# Patient Record
Sex: MALE | Race: WHITE
[De-identification: names, ages, dates, MRNs, and addresses within clinical notes are randomized per-mention and may not be internally consistent; named-entity substitution may affect disease eponyms.]

---

## 2019-09-16 ENCOUNTER — HOSPITAL ENCOUNTER (INPATIENT)
Dept: HOSPITAL 47 - EC | Age: 29
LOS: 9 days | Discharge: HOME | DRG: 880 | End: 2019-09-25
Attending: PSYCHIATRY & NEUROLOGY | Admitting: PSYCHIATRY & NEUROLOGY
Payer: MEDICAID

## 2019-09-16 DIAGNOSIS — F41.9: ICD-10-CM

## 2019-09-16 DIAGNOSIS — Z71.6: ICD-10-CM

## 2019-09-16 DIAGNOSIS — F17.210: ICD-10-CM

## 2019-09-16 DIAGNOSIS — F12.10: ICD-10-CM

## 2019-09-16 DIAGNOSIS — G47.00: ICD-10-CM

## 2019-09-16 DIAGNOSIS — F31.13: ICD-10-CM

## 2019-09-16 DIAGNOSIS — Z90.49: ICD-10-CM

## 2019-09-16 DIAGNOSIS — E07.9: ICD-10-CM

## 2019-09-16 DIAGNOSIS — R45.851: Primary | ICD-10-CM

## 2019-09-16 DIAGNOSIS — F10.10: ICD-10-CM

## 2019-09-16 PROCEDURE — 99285 EMERGENCY DEPT VISIT HI MDM: CPT

## 2019-09-16 PROCEDURE — 84443 ASSAY THYROID STIM HORMONE: CPT

## 2019-09-16 PROCEDURE — 80053 COMPREHEN METABOLIC PANEL: CPT

## 2019-09-16 PROCEDURE — 80164 ASSAY DIPROPYLACETIC ACD TOT: CPT

## 2019-09-16 PROCEDURE — 80061 LIPID PANEL: CPT

## 2019-09-16 PROCEDURE — 85025 COMPLETE CBC W/AUTO DIFF WBC: CPT

## 2019-09-16 PROCEDURE — 83036 HEMOGLOBIN GLYCOSYLATED A1C: CPT

## 2019-09-16 PROCEDURE — 81003 URINALYSIS AUTO W/O SCOPE: CPT

## 2019-09-16 PROCEDURE — 82075 ASSAY OF BREATH ETHANOL: CPT

## 2019-09-16 PROCEDURE — 96372 THER/PROPH/DIAG INJ SC/IM: CPT

## 2019-09-16 PROCEDURE — 80306 DRUG TEST PRSMV INSTRMNT: CPT

## 2019-09-16 NOTE — ED
Psych HPI





- General


Source: patient, police, RN notes reviewed


Mode of arrival: ambulatory


Limitations: no limitations





<Juarez Funk - Last Filed: 09/16/19 13:44>





- History of Present Illness


MD Complaint: suicidal ideation, other (manic activity)


Associated Psychiatric Symptoms: racing thoughts, auditory hallucinations, 

visual hallucinations, delusions


Improves With: none


Associated Symptoms: insomnia


Treatments Prior to Arrival: placed on mental health hold





<Martir Garcia - Last Filed: 09/16/19 18:07>





- General


Chief Complaint: Psychiatric Symptoms


Stated Complaint: Mental Health


Time Seen by Provider: 09/16/19 13:17





- History of Present Illness


Initial Comments: 





This a 28-year-old male presents emergency Department with [facility] 

department for psychiatric evaluation.  Patient was petitioned ordered by family

for psychiatric evaluation he has no psychiatric history denies being suicidal 

or homicidal.  Patient has been having very manic behavior but states that he is

just changing things in his life.  He denies any drug use other than marijuana. 

No alcohol abuse.  Patient states he was seen at [facility] a few

days ago and had evaluation there also. (Juarez Funk)


28-year-old patient with for psychiatric evaluation by , patient refusing 

to sign in voluntarily (Martir Garcia)





- Related Data


                                Home Medications











 Medication  Instructions  Recorded  Confirmed


 


No Known Home Medications  12/04/17 09/16/19











                                    Allergies











Allergy/AdvReac Type Severity Reaction Status Date / Time


 


No Known Allergies Allergy   Verified 09/16/19 13:45














Review of Systems


ROS Other: All systems not noted in ROS Statement are negative.





<Juarez Funk - Last Filed: 09/16/19 13:44>


ROS Other: All systems not noted in ROS Statement are negative.





<Martir Garcia - Last Filed: 09/16/19 18:07>


ROS Statement: 


Those systems with pertinent positive or pertinent negative responses have been 

documented in the HPI.








Past Medical History


Past Medical History: Thyroid Disorder


History of Any Multi-Drug Resistant Organisms: None Reported


Past Surgical History: Appendectomy, Hernia Repair


Past Psychological History: Anxiety


Smoking Status: Current every day smoker


Past Alcohol Use History: Occasional


Past Drug Use History: None Reported





<Juarez Funk - Last Filed: 09/16/19 13:44>





General Exam


Limitations: no limitations


General appearance: alert, in no apparent distress, anxious


Head exam: Present: atraumatic, normocephalic, normal inspection


Eye exam: Present: normal appearance, PERRL, EOMI.  Absent: scleral icterus, 

conjunctival injection, periorbital swelling


ENT exam: Present: normal exam, normal oropharynx, mucous membranes moist


Neck exam: Present: normal inspection.  Absent: tenderness, meningismus, 

lymphadenopathy


Respiratory exam: Present: normal lung sounds bilaterally.  Absent: respiratory 

distress, wheezes, rales, rhonchi, stridor


Cardiovascular Exam: Present: regular rate, normal rhythm, normal heart sounds. 

Absent: systolic murmur, diastolic murmur, rubs, gallop, clicks


GI/Abdominal exam: Present: soft, normal bowel sounds.  Absent: distended, 

tenderness, guarding, rebound, rigid


Neurological exam: Present: alert, oriented X3, CN II-XII intact


Psychiatric exam: Present: manic





<Juarez Funk M - Last Filed: 09/16/19 13:44>


General appearance: alert, in no apparent distress, anxious


Head exam: Present: atraumatic, normocephalic, normal inspection


Eye exam: Present: normal appearance, EOMI.  Absent: scleral icterus, 

conjunctival injection, periorbital swelling


ENT exam: Present: normal exam, mucous membranes moist


Neck exam: Present: normal inspection.  Absent: tenderness, meningismus, 

lymphadenopathy


Respiratory exam: Present: normal lung sounds bilaterally.  Absent: respiratory 

distress, wheezes, rales, rhonchi, stridor


Cardiovascular Exam: Present: regular rate, normal rhythm, normal heart sounds. 

Absent: systolic murmur, diastolic murmur, rubs, gallop, clicks


GI/Abdominal exam: Present: soft, normal bowel sounds.  Absent: distended, 

tenderness, guarding, rebound, rigid


Extremities exam: Present: normal inspection, full ROM, normal capillary refill.

 Absent: tenderness, pedal edema, joint swelling, calf tenderness


Back exam: Present: normal inspection


Neurological exam: Present: alert, oriented X3, CN II-XII intact


Psychiatric exam: Present: agitated (At times), manic


Skin exam: Present: warm, dry, intact, normal color.  Absent: rash





<Martir Garcia B - Last Filed: 09/16/19 18:07>





Course





<Martir Garcia - Last Filed: 09/16/19 18:07>





                                   Vital Signs











  09/16/19





  13:10


 


Temperature 98.2 F


 


Pulse Rate 78


 


Respiratory 18





Rate 


 


Blood Pressure 161/80


 


O2 Sat by Pulse 100





Oximetry 














- Reevaluation(s)


Reevaluation #1: 





09/16/19 18:06


Patient medically clear for psychiatric evaluation





Patient was seen and evaluated by myself here in the ER (Martir Garcia)





Medical Decision Making





<Martir Garcia - Last Filed: 09/16/19 18:07>





- Medical Decision Making





28 male the ER for evaluation patient be admitted for psychiatric evaluation and

treatment. (Martir Garcia)





- Lab Data





                                   Lab Results











  09/16/19 Range/Units





  14:08 


 


Urine Opiates Screen  Not Detected  (NotDetected)  


 


Ur Oxycodone Screen  Not Detected  (NotDetected)  


 


Urine Methadone Screen  Not Detected  (NotDetected)  


 


Ur Propoxyphene Screen  Not Detected  (NotDetected)  


 


Ur Barbiturates Screen  Not Detected  (NotDetected)  


 


U Tricyclic Antidepress  Not Detected  (NotDetected)  


 


Ur Phencyclidine Scrn  Not Detected  (NotDetected)  


 


Ur Amphetamines Screen  Not Detected  (NotDetected)  


 


U Methamphetamines Scrn  Not Detected  (NotDetected)  


 


U Benzodiazepines Scrn  Not Detected  (NotDetected)  


 


Urine Cocaine Screen  Not Detected  (NotDetected)  


 


U Marijuana (THC) Screen  Detected H  (NotDetected)  














Disposition





<Juarez Funk - Last Filed: 09/16/19 13:44>


Is patient prescribed a controlled substance at d/c from ED?: No





<Martir Garcia - Last Filed: 09/16/19 18:07>


Clinical Impression: 


 Psychosis, Monopolar yolanda





Disposition: TRANSFER TO PSYCH HOSP/UNIT


Condition: Fair


Referrals: 


Atilio Franco MD [Primary Care Provider] - 1-2 days

## 2019-09-17 LAB
ALBUMIN SERPL-MCNC: 4.6 G/DL (ref 3.5–5)
ALP SERPL-CCNC: 69 U/L (ref 38–126)
ALT SERPL-CCNC: 37 U/L (ref 21–72)
ANION GAP SERPL CALC-SCNC: 13 MMOL/L
AST SERPL-CCNC: 48 U/L (ref 17–59)
BASOPHILS # BLD AUTO: 0.1 K/UL (ref 0–0.2)
BASOPHILS NFR BLD AUTO: 1 %
BUN SERPL-SCNC: 12 MG/DL (ref 9–20)
CALCIUM SPEC-MCNC: 9.6 MG/DL (ref 8.4–10.2)
CHLORIDE SERPL-SCNC: 109 MMOL/L (ref 98–107)
CHOLEST SERPL-MCNC: 114 MG/DL (ref ?–200)
CO2 SERPL-SCNC: 22 MMOL/L (ref 22–30)
EOSINOPHIL # BLD AUTO: 0.2 K/UL (ref 0–0.7)
EOSINOPHIL NFR BLD AUTO: 3 %
ERYTHROCYTE [DISTWIDTH] IN BLOOD BY AUTOMATED COUNT: 4.91 M/UL (ref 4.3–5.9)
ERYTHROCYTE [DISTWIDTH] IN BLOOD: 12.8 % (ref 11.5–15.5)
GLUCOSE SERPL-MCNC: 103 MG/DL (ref 74–99)
HBA1C MFR BLD: 5.4 % (ref 4–6)
HCT VFR BLD AUTO: 46.6 % (ref 39–53)
HDLC SERPL-MCNC: 48 MG/DL (ref 40–60)
HGB BLD-MCNC: 15.6 GM/DL (ref 13–17.5)
LDLC SERPL CALC-MCNC: 53 MG/DL (ref 0–99)
LYMPHOCYTES # SPEC AUTO: 2 K/UL (ref 1–4.8)
LYMPHOCYTES NFR SPEC AUTO: 26 %
MCH RBC QN AUTO: 31.8 PG (ref 25–35)
MCHC RBC AUTO-ENTMCNC: 33.5 G/DL (ref 31–37)
MCV RBC AUTO: 94.9 FL (ref 80–100)
MONOCYTES # BLD AUTO: 0.6 K/UL (ref 0–1)
MONOCYTES NFR BLD AUTO: 8 %
NEUTROPHILS # BLD AUTO: 4.6 K/UL (ref 1.3–7.7)
NEUTROPHILS NFR BLD AUTO: 59 %
PLATELET # BLD AUTO: 273 K/UL (ref 150–450)
POTASSIUM SERPL-SCNC: 4.6 MMOL/L (ref 3.5–5.1)
PROT SERPL-MCNC: 7.8 G/DL (ref 6.3–8.2)
SODIUM SERPL-SCNC: 144 MMOL/L (ref 137–145)
TRIGL SERPL-MCNC: 66 MG/DL (ref ?–150)
WBC # BLD AUTO: 7.7 K/UL (ref 3.8–10.6)

## 2019-09-17 RX ADMIN — NICOTINE SCH PATCH: 21 PATCH, EXTENDED RELEASE TRANSDERMAL at 14:23

## 2019-09-17 RX ADMIN — Medication SCH MG: at 21:22

## 2019-09-17 NOTE — P.CONS
History of Present Illness





- History of Present Illness





This is a pleasant 28 years old male with past medical history of anxiety that 

is been managed by his primary care doctor, Previous history of alcohol abuse 

currently is abstinent with his last check about 2 months ago as per patient.  

He was admitted to the hospital yesterday to the mental health unit for racing 

thoughts, auditory hallucinations, visual hallucinations, delusions.  We are 

asked to see the patient for medical management.  Patient denies chest pain.  No

dyspnea.  No abdominal pain or nausea vomiting.  No change in urine or polyps.  

No fever.  He denies a smoking and he quit alcohol drinking about 2 months ago. 

He uses marijuana as medical prescription echo as he states it helped him with 

his sleep for his insomnia and with his appetite.


Patient is afebrile and all labs are stable.  Labs including CBC, BMP and liver 

enzymes were unremarkable, urine drug screen is positive for marijuana.  Patient

is on Ativan when necessary, Tylenol and Geodon.














Review of Systems





CONSTITUTIONAL: No fever, no malaise, no fatigue. 


HEENT: No recent visual problems or hearing problems. Denied any sore throat. 


CARDIOVASCULAR: No  orthopnea, PND, no palpitations, no syncope. 


PULMONARY: No shortness of breath, no cough, no hemoptysis. 


GASTROINTESTINAL: No diarrhea, no nausea, no vomiting, no abdominal pain. 

Normoactive bowel sounds. 


NEUROLOGICAL: No headaches, no weakness, no numbness. 


HEMATOLOGICAL: Denies any bleeding or petechiae. 


GENITOURINARY: Denies any burning micturition, frequency, or urgency. 


MUSCULOSKELETAL/RHEUMATOLOGICAL: Denies any joint pain, swelling, or any muscle 

pain. 


ENDOCRINE: Denies any polyuria or polydipsia.











Past Medical History


Past Medical History: Thyroid Disorder


History of Any Multi-Drug Resistant Organisms: None Reported


Past Surgical History: Appendectomy, Hernia Repair


Smoking Status: Current every day smoker





Medications and Allergies


                                Home Medications











 Medication  Instructions  Recorded  Confirmed  Type


 


No Known Home Medications  12/04/17 09/16/19 History








                                    Allergies











Allergy/AdvReac Type Severity Reaction Status Date / Time


 


No Known Allergies Allergy   Verified 09/16/19 13:45














Physical Exam


Vitals: 


                                   Vital Signs











  Temp Pulse Pulse Resp BP BP Pulse Ox


 


 09/17/19 06:52  98 F   65  16   109/62 


 


 09/16/19 22:45  97.5 F L   63  16   119/71 


 


 09/16/19 21:00  97.3 F L  75   18  116/57   98








                                Intake and Output











 09/16/19 09/17/19 09/17/19





 22:59 06:59 14:59


 


Other:   


 


  Weight  68.039 kg 














GENERAL: The patient is alert and oriented x3, not in any acute distress. Well 

developed, well nourished. 


HEENT: Pupils are round and equally reacting to light. EOMI. No scleral icterus.

No conjunctival pallor. Normocephalic, atraumatic. No pharyngeal erythema. No 

thyromegaly. 


CARDIOVASCULAR: S1 and S2 present. No murmurs, rubs, or gallops. 


PULMONARY: Chest is clear to auscultation, no wheezing or crackles. 


ABDOMEN: Soft, nontender, nondistended, normoactive bowel sounds. No palpable 

organomegaly. 


MUSCULOSKELETAL: No joint swelling or deformity. 


EXTREMITIES: No cyanosis, clubbing, or pedal edema. 


NEUROLOGICAL: Gross neurological examination did not reveal any focal deficits. 


SKIN: No rashes.











Results


CBC & Chem 7: 


                                 09/17/19 07:42





                                 09/17/19 07:42


Labs: 


                  Abnormal Lab Results - Last 24 Hours (Table)











  09/16/19 09/17/19 Range/Units





  14:08 07:42 


 


Chloride   109 H  ()  mmol/L


 


Glucose   103 H  (74-99)  mg/dL


 


U Marijuana (THC) Screen  Detected H   (NotDetected)  














Assessment and Plan


Assessment: 





Possible psychosis and other psychiatric illnesses.  Management as per the 

primary psychiatrist team


Insomnia, mostly related to his psychiatric illness


Previous history of alcohol abuse














Plan: 





This is a pleasant 28 years old male who presents with possible psychosis.labs 

and medication were reviewed..  Continue same treatment.  Continue with 

symptomatic treatment.  Resume home medication.  Monitor lytes and vitals.  DVT 

and GI prophylaxis.  Further recommendations of the clinical course of the 

patient


We recommend patient follow up with his PCP within one week after discharge





Thank you for consulting us.  We will see the patient and as needed basis.  

Please feel free to contact us for any further question or concerns

## 2019-09-17 NOTE — P.HP
Psychiatric H&P





- .


H&P Date: 09/17/19


History & Physical: 


                                    Allergies











Allergy/AdvReac Type Severity Reaction Status Date / Time


 


No Known Allergies Allergy   Verified 09/16/19 13:45








                                   Vital Signs











Temp  98 F   09/17/19 06:52


 


Pulse  65   09/17/19 06:52


 


Resp  16   09/17/19 06:52


 


BP  109/62   09/17/19 06:52


 


Pulse Ox  98   09/16/19 21:00








                                 Intake & Output











 09/16/19 09/17/19 09/17/19





 18:59 06:59 18:59


 


Weight 68.946 kg 68.039 kg 








                             Laboratory Last Values











WBC  7.7 k/uL (3.8-10.6)   09/17/19  07:42    


 


RBC  4.91 m/uL (4.30-5.90)   09/17/19  07:42    


 


Hgb  15.6 gm/dL (13.0-17.5)   09/17/19  07:42    


 


Hct  46.6 % (39.0-53.0)   09/17/19  07:42    


 


MCV  94.9 fL (80.0-100.0)   09/17/19  07:42    


 


MCH  31.8 pg (25.0-35.0)   09/17/19  07:42    


 


MCHC  33.5 g/dL (31.0-37.0)   09/17/19  07:42    


 


RDW  12.8 % (11.5-15.5)   09/17/19  07:42    


 


Plt Count  273 k/uL (150-450)   09/17/19  07:42    


 


Neutrophils %  59 %  09/17/19  07:42    


 


Lymphocytes %  26 %  09/17/19  07:42    


 


Monocytes %  8 %  09/17/19  07:42    


 


Eosinophils %  3 %  09/17/19  07:42    


 


Basophils %  1 %  09/17/19  07:42    


 


Neutrophils #  4.6 k/uL (1.3-7.7)   09/17/19  07:42    


 


Lymphocytes #  2.0 k/uL (1.0-4.8)   09/17/19  07:42    


 


Monocytes #  0.6 k/uL (0-1.0)   09/17/19  07:42    


 


Eosinophils #  0.2 k/uL (0-0.7)   09/17/19  07:42    


 


Basophils #  0.1 k/uL (0-0.2)   09/17/19  07:42    


 


Sodium  144 mmol/L (137-145)   09/17/19  07:42    


 


Potassium  4.6 mmol/L (3.5-5.1)   09/17/19  07:42    


 


Chloride  109 mmol/L ()  H  09/17/19  07:42    


 


Carbon Dioxide  22 mmol/L (22-30)   09/17/19  07:42    


 


Anion Gap  13 mmol/L  09/17/19  07:42    


 


BUN  12 mg/dL (9-20)   09/17/19  07:42    


 


Creatinine  0.96 mg/dL (0.66-1.25)   09/17/19  07:42    


 


Est GFR (CKD-EPI)AfAm  >90  (>60 ml/min/1.73 sqM)   09/17/19  07:42    


 


Est GFR (CKD-EPI)NonAf  >90  (>60 ml/min/1.73 sqM)   09/17/19  07:42    


 


Glucose  103 mg/dL (74-99)  H  09/17/19  07:42    


 


Calcium  9.6 mg/dL (8.4-10.2)   09/17/19  07:42    


 


Total Bilirubin  0.6 mg/dL (0.2-1.3)   09/17/19  07:42    


 


AST  48 U/L (17-59)   09/17/19  07:42    


 


ALT  37 U/L (21-72)   09/17/19  07:42    


 


Alkaline Phosphatase  69 U/L ()   09/17/19  07:42    


 


Total Protein  7.8 g/dL (6.3-8.2)   09/17/19  07:42    


 


Albumin  4.6 g/dL (3.5-5.0)   09/17/19  07:42    


 


Triglycerides  66 mg/dL (<150)   09/17/19  07:42    


 


Cholesterol  114 mg/dL (<200)   09/17/19  07:42    


 


LDL Cholesterol, Calc  53 mg/dL (0-99)   09/17/19  07:42    


 


HDL Cholesterol  48 mg/dL (40-60)   09/17/19  07:42    


 


TSH  2.030 mIU/L (0.465-4.680)   09/17/19  07:42    


 


Urine Opiates Screen  Not Detected  (NotDetected)   09/16/19  14:08    


 


Ur Oxycodone Screen  Not Detected  (NotDetected)   09/16/19  14:08    


 


Urine Methadone Screen  Not Detected  (NotDetected)   09/16/19  14:08    


 


Ur Propoxyphene Screen  Not Detected  (NotDetected)   09/16/19  14:08    


 


Ur Barbiturates Screen  Not Detected  (NotDetected)   09/16/19  14:08    


 


U Tricyclic Antidepress  Not Detected  (NotDetected)   09/16/19  14:08    


 


Ur Phencyclidine Scrn  Not Detected  (NotDetected)   09/16/19  14:08    


 


Ur Amphetamines Screen  Not Detected  (NotDetected)   09/16/19  14:08    


 


U Methamphetamines Scrn  Not Detected  (NotDetected)   09/16/19  14:08    


 


U Benzodiazepines Scrn  Not Detected  (NotDetected)   09/16/19  14:08    


 


Urine Cocaine Screen  Not Detected  (NotDetected)   09/16/19  14:08    


 


U Marijuana (THC) Screen  Detected  (NotDetected)  H  09/16/19  14:08    











09/17/19 14:51


IDENTIFYING DATA: Patient is a 28-year-old  male who currently lives 

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





HPI: Patient presented to the hospital after family insisted that he have a 

psychiatric evaluation.  Patient was transferred from [facility] 

to [facility].  In the ER patient had concerning behaviors for being manic and was

therefore admitted to mental health unit.  She was found to have a UDS positive 

for cannabis on admission.  Patient was agreeable to speak to writer in the 

office and appeared to be neatly groomed and was directable.  Patient however 

was hyperverbal and tangential and grandiose with loosening of associations.  

Patient spoke about having a "beautiful mood" and states that she wants to work 

here in the hospital and claimed that he can help people with dementia.  He 

listed off another patient on the unit and stated that he can help people like 

her and when asked how he states "just by distracting her it's so easy".  

Patient had racing thoughts flight of ideas spoke about studying hard and 

gaining knowledge.  Patient stated that his family was getting worried about him

and he claims that he stopped taking Dr. Franco's medications for his mood 

including Paxil and Xanax for months as he claims that his mom convinced him to 

stop and that he didn't need it.  He claims that since then he's been "self-

medicating myself with booze and weed" and claims that she was drinking up to 

attend to 15 beers every other day and his last drink was over a month ago.  He 

states that he does have racing thoughts and feels "emotional".  He admitted to 

fair sleep last night and denies any depressive symptoms at this time.  He 

denies suicidal or homicidal ideations intent or plan.  At this time patient 

denies any auditory or visual hallucinations.  Patient admits to using marijuana

approximately 1-2 joints per night for sleep and anxiety however states that the

marijuana was making him feel paranoid and more anxious so he stopped.  He 

admits to using cigarettes approximately three-quarter pack per day.





PAST PSYCHIATRIC HISTORY: Patient denies any previous psychiatric admissions and

denies seeing an outpatient psychiatrist claims that he gets his medications 

from his primary care doctor and was on Xanax and Paxil.  He states that he 

denies any suicide attempts in the past.





PMH: Denies





ALLERGIES: [NKDA]





CHEMICAL DEPENDENCY HISTORY: Per HPI





FAMILY PSYCHIATRIC/SUBSTANCE USE HISTORY: Denies





SOCIAL HISTORY: He states that he is born and raised in [place] and 

claims that he had to quit school in the ninth grade due to having kids.  He 

states that since then he's been working as a cook and lives in a house with his

 4 kids who are currently being cared for by his kids mother.





MENTAL STATUS EXAM: 


General Appearance: [Patient appears to be stated age is alert, pleasant, and 

cooperative.]  Patient is grandiose, hyperverbal and somewhat intrusive.  He is 

wearing street clothing and is neatly groomed.


Behavior: [Patient is seated however is somewhat intrusive and hyperverbal.


Speech: Patient's speech is fluent and hyperverbal. 


Mood/Affect: Patient reports their mood is "beautiful", affect is congruent and 

labile


Suicidality/Homicidality:  Patient denies having any suicidal or homicidal id

eation intent or plan.  


Perceptions: Patient denies any auditory or visual hallucinations.  


Though content/process: There is no evidence of any delusional thought content 

and thought process is Racing thoughts, flight of ideas tangential and is 

grandiose.


Memory and concentration: AOX3, grossly intact for the purposes of this session.

 Can spell "WORLD" backwards


Judgment and insight: Poor





STRENGTHS/WEAKNESSES: Has good employment and stable housing, however has poor 

coping skills and poor insight.





INTELLECT: Average





IMPRESSIONS: 


Bipolar disorder severe currently in a manic episode


Cannabis use disorder


Alcohol use disorder


Nicotine dependence.





PLAN: 


-Patient is admitted under [voluntary] status to MHU for stabilization of 

psychiatric symptoms and safety. Patient signed adult voluntary form and 

medication consent and is placed in patient's chart.


-Medications : Will start patient on Abilify 5 mg daily for mood stabilization. 

 We'll plan to increase as tolerated.


-Ativan and Geodon PRN for agitation/aggression


[-Patient was counselled on substance abuse and desired to cut back on use]


-Patient was informed of the risks, benefits and side effects of the medication 

and patient verbally consented to taking the medications. Patient signed med 

consent form and was placed in chart.


-NRT -nicotine patch


-SW on board for discharge planning

## 2019-09-18 RX ADMIN — Medication SCH MG: at 21:48

## 2019-09-18 RX ADMIN — FLUTICASONE PROPIONATE SCH SPRAY: 50 SPRAY, METERED NASAL at 11:49

## 2019-09-18 RX ADMIN — NICOTINE SCH PATCH: 21 PATCH, EXTENDED RELEASE TRANSDERMAL at 08:48

## 2019-09-18 NOTE — P.PN
Progress Note - Text


Progress Note Date: 09/18/19





Interval History:


Patient was seen this morning wandering the hallways and was agreeable to speak 

to writer in the office.  Patient continues to have a positive attitude however 

is hyperverbal and elated and his mood.  He states that he is continuing to help

others on the unit and remained positive and is very happy that he is on the 

unit and getting better.  He states that he is happy to help out other people 

whatever they needed it.  Patient claims his mood is "awesome" denies any 

depressive symptoms at this time.  Patient is tangential with flight of ideas 

however is directable at this time.  He states that he slept well last night and

offers no overnight complaints and is eating well and has good energy.  Patient 

spoke about his kids and missing them and asked about when he is in leave the 

hospital. At this time patient denies any suicidal or homical ideations, intent 

or plan. Patient denies any auditory, visual hallucinations and denies any 

paranoia or delusions. Patient denies any side effects from the medications and 

has been compliant with meds. 





Mental Status Exam:


General Appearance: Patient appears to be stated age is alert, pleasant, and 

cooperative. Patient is hyperverbal.  He is wearing street clothing and is 

neatly groomed.


Behavior: Patient is seated, with no agitation.


Speech: Patient's speech is fluent and hyperverbal. 


Mood/Affect: Patient reports their mood is "awesome", affect is congruent


Suicidality/Homicidality:  Patient denies having any suicidal or homicidal 

ideation intent or plan.  


Perceptions: Patient denies any auditory or visual hallucinations.  


Though content/process: There is no evidence of any delusional thought content 

and thought process is Racing thoughts, flight of ideas tangential and is 

grandiose.


Memory and concentration: AOX3, grossly intact for the purposes of this session.


Judgment and insight: Poor, improving mildly.





Assessment


Bipolar disorder severe currently in a manic episode


Cannabis use disorder


Alcohol use disorder


Nicotine dependence.





Plan:


-Patient continues to meet criteria for inpatient psychiatric admission for 

symptom stabilization and safety.  Patient did sign for adult voluntary form and

medication consent and is placed in patient's chart.


-Medications: Will increase Abilify to 10 mg daily for mood stabilization and 

will continue to increase as tolerated.


-When necessary Geodon and Ativan for agitation/aggression.


-NRT -nicotine patch


-SW on board for discharge planning.

## 2019-09-19 LAB
PH UR: 6.5 [PH] (ref 5–8)
SP GR UR: 1.01 (ref 1–1.03)
UROBILINOGEN UR QL STRIP: <2 MG/DL (ref ?–2)

## 2019-09-19 RX ADMIN — ACETAMINOPHEN PRN ML: 160 SOLUTION ORAL at 21:40

## 2019-09-19 RX ADMIN — NICOTINE SCH PATCH: 21 PATCH, EXTENDED RELEASE TRANSDERMAL at 08:54

## 2019-09-19 RX ADMIN — FLUTICASONE PROPIONATE SCH: 50 SPRAY, METERED NASAL at 09:15

## 2019-09-19 RX ADMIN — Medication SCH MG: at 21:38

## 2019-09-19 RX ADMIN — DIVALPROEX SODIUM SCH MG: 500 TABLET, FILM COATED, EXTENDED RELEASE ORAL at 09:15

## 2019-09-19 RX ADMIN — DIVALPROEX SODIUM SCH MG: 500 TABLET, FILM COATED, EXTENDED RELEASE ORAL at 21:37

## 2019-09-19 NOTE — P.PN
Progress Note - Text


Progress Note Date: 09/19/19





Interval History:


Patient was seen this morning wandering the hallways and was agreeable to speak 

to writer in the office.  Patient continues to be hyperverbal and elated in his 

mood.  He states that he is continuing to feel "positive and optimistic" and 

spoke of his kids and being a good role model for them and wanting to 

"voluntarily get treatment".  Patient continues to talk about providing for his 

family and was preoccupied with discharge however was tangential and illogical 

at times.  He denies any depressive symptoms at this time.  Patient is 

tangential with flight of ideas however is directable at this time.  He states 

that he slept well last night and offers no overnight complaints and is eating 

well and has good energy.  Patient spoke about his kids and missing them and 

asked about when he is in leave the hospital. At this time patient denies any 

suicidal or homical ideations, intent or plan. Patient denies any auditory, 

visual hallucinations and denies any paranoia or delusions. Patient denies any 

side effects from the medications and has been compliant with meds. 





Mental Status Exam:


General Appearance: Patient appears to be stated age is alert, pleasant, and 

cooperative. Patient is hyperverbal.  He is wearing street clothing and is 

neatly groomed.


Behavior: Patient is seated, with no agitation.


Speech: Patient's speech is fluent and hyperverbal. 


Mood/Affect: Patient reports their mood is "positive and optimistic", affect is 

congruent and expansive.


Suicidality/Homicidality:  Patient denies having any suicidal or homicidal 

ideation intent or plan.  


Perceptions: Patient denies any auditory or visual hallucinations.  


Though content/process: There is no evidence of any delusional thought content 

and thought process is Racing thoughts, flight of ideas tangential and is 

grandiose.


Memory and concentration: AOX3, grossly intact for the purposes of this session.


Judgment and insight: Poor, improving mildly.





Assessment


Bipolar disorder severe currently in a manic episode


Cannabis use disorder


Alcohol use disorder


Nicotine dependence.





Plan:


-Patient continues to meet criteria for inpatient psychiatric admission for 

symptom stabilization and safety.  Patient did sign for adult voluntary form and

medication consent and is placed in patient's chart.


-Medications: Will increase Abilify to 15 mg daily for mood stabilization and 

will continue to increase as tolerated.  Added Depakote 500 mg twice a day for 

mood stabilization.


-When necessary Geodon and Ativan for agitation/aggression.


-NRT - nicotine patch


-SW on board for discharge planning.

## 2019-09-20 RX ADMIN — Medication SCH MG: at 21:41

## 2019-09-20 RX ADMIN — NICOTINE SCH PATCH: 21 PATCH, EXTENDED RELEASE TRANSDERMAL at 08:59

## 2019-09-20 RX ADMIN — DIVALPROEX SODIUM SCH MG: 500 TABLET, FILM COATED, EXTENDED RELEASE ORAL at 21:41

## 2019-09-20 RX ADMIN — FLUTICASONE PROPIONATE SCH SPRAY: 50 SPRAY, METERED NASAL at 08:59

## 2019-09-20 RX ADMIN — DIVALPROEX SODIUM SCH MG: 500 TABLET, FILM COATED, EXTENDED RELEASE ORAL at 09:00

## 2019-09-20 NOTE — P.PN
Progress Note - Text


Progress Note Date: 09/20/19





Interval History:


Patient was seen this morning wandering the hallways and was agreeable to speak 

to writer in the office. Patient is continuing to show some improvement in his 

mood and appears to be more calmer during the interview.  Patient however is 

still tangential and spoke about multiple different goals of going to school and

opening up a business.  He claims all however that he is feeling calmer and able

to read now and watches TV without any distractions and reviewed some of his goa

ls with writer which he wrote down.  He states that he is continuing to feel 

"optimistic" and spoke of his family and how much she misses him.  He denies any

depressive symptoms at this time and denies any side effects from medications.  

Patient is tangential with flight of ideas however is directable at this time.  

He states that he slept well last night and offers no overnight complaints and 

is eating well and has good energy. At this time patient denies any suicidal or 

homical ideations, intent or plan. Patient denies any auditory, visual 

hallucinations and denies any paranoia or delusions. Patient denies any side 

effects from the medications and has been compliant with meds. 





Mental Status Exam:


General Appearance: Patient appears to be stated age is alert, pleasant, and 

cooperative. He is wearing street clothing and is neatly groomed.


Behavior: Patient is seated, with no agitation.


Speech: Patient's speech is fluent and mildly improved with regards to his 

hyperverbal. 


Mood/Affect: Patient reports their mood is "optimistic", affect is congruent and

expansive.


Suicidality/Homicidality: Patient denies having any suicidal or homicidal 

ideation intent or plan.  


Perceptions: Patient denies any auditory or visual hallucinations.  


Though content/process: There is no evidence of any delusional thought content 

and thought process is Racing thoughts, flight of ideas tangential which has 

improved mildly.


Memory and concentration: AOX3, grossly intact for the purposes of this session.


Judgment and insight: Poor, improving mildly.





Assessment


Bipolar disorder severe currently in a manic episode


Cannabis use disorder


Alcohol use disorder


Nicotine dependence.





Plan:


-Patient continues to meet criteria for inpatient psychiatric admission for 

symptom stabilization and safety.  Patient did sign for adult voluntary form and

medication consent and is placed in patient's chart.


-Medications: Will increase Abilify to 20 mg daily for mood stabilization.  

Continue with Depakote 500 mg twice a day for mood stabilization, we'll consider

increasing tomorrow if needed.


-When necessary Geodon and Ativan for agitation/aggression.


-NRT - nicotine patch


-SW on board for discharge planning.  Likely discharge back home early next 

week.  Patient was encouraged to have his mother come in to visit him over the 

weekend.

## 2019-09-21 RX ADMIN — ACETAMINOPHEN PRN MG: 325 TABLET, FILM COATED ORAL at 14:19

## 2019-09-21 RX ADMIN — DIVALPROEX SODIUM SCH MG: 500 TABLET, FILM COATED, EXTENDED RELEASE ORAL at 08:02

## 2019-09-21 RX ADMIN — Medication SCH: at 22:13

## 2019-09-21 RX ADMIN — FLUTICASONE PROPIONATE SCH SPRAY: 50 SPRAY, METERED NASAL at 08:02

## 2019-09-21 RX ADMIN — ACETAMINOPHEN PRN ML: 160 SOLUTION ORAL at 14:20

## 2019-09-21 RX ADMIN — NICOTINE SCH PATCH: 14 PATCH, EXTENDED RELEASE TRANSDERMAL at 08:02

## 2019-09-21 RX ADMIN — DIVALPROEX SODIUM SCH MG: 500 TABLET, FILM COATED, EXTENDED RELEASE ORAL at 22:10

## 2019-09-21 NOTE — P.PN
Progress Note - Text


Progress Note Date: 09/21/19





Interval History:


Patient was seen this morning wandering the hallways and was agreeable to speak 

to writer in the office. Patient is continuing to show improvement in his mood. 

Patient continues to be tangential and spoke of the kindness that he is 

receiving on the unit and was thankful to writer several times.  He states that 

he is going to groups and is enjoying them in the company of other people.  He 

denies any depressive symptoms at this time and denies any side effects from 

medications.   He states that he slept well last night and offers no overnight 

complaints and is eating well and has good energy. At this time patient denies 

any suicidal or homical ideations, intent or plan. Patient denies any auditory, 

visual hallucinations and denies any paranoia or delusions. Patient denies any 

side effects from the medications and has been compliant with meds. 





Mental Status Exam:


General Appearance: Patient appears to be stated age is alert, pleasant, and 

cooperative. He is wearing street clothing and is neatly groomed.


Behavior: Patient is seated, with no agitation.


Speech: Patient's speech is fluent and mildly improved with regards to his 

hyperverbal. 


Mood/Affect: Patient reports their mood is "good", affect is congruent


Suicidality/Homicidality: Patient denies having any suicidal or homicidal 

ideation intent or plan.  


Perceptions: Patient denies any auditory or visual hallucinations.  


Though content/process: There is no evidence of any delusional thought content 

and thought process is Racing thoughts, flight of ideas tangential which has 

improved mildly.


Memory and concentration: AOX3, grossly intact for the purposes of this session.


Judgment and insight: Poor, improving mildly.





Assessment


Bipolar disorder severe currently in a manic episode


Cannabis use disorder


Alcohol use disorder


Nicotine dependence.





Plan:


-Patient continues to meet criteria for inpatient psychiatric admission for 

symptom stabilization and safety.  Patient did sign for adult voluntary form and

medication consent and is placed in patient's chart.


-Medications: Will continue with Abilify to 20 mg daily for mood stabilization. 

Continue increasing Depakote 500 mg +1000 mg daily at bedtime for mood 

stabilization


-When necessary Geodon and Ativan for agitation/aggression.


-NRT - nicotine patch


-SW on board for discharge planning.  Likely discharge back home early next 

week.  Patient was encouraged to have his mother come in to visit him over the 

weekend.

## 2019-09-22 RX ADMIN — DIVALPROEX SODIUM SCH MG: 500 TABLET, FILM COATED, EXTENDED RELEASE ORAL at 08:45

## 2019-09-22 RX ADMIN — FLUTICASONE PROPIONATE SCH SPRAY: 50 SPRAY, METERED NASAL at 08:45

## 2019-09-22 RX ADMIN — NICOTINE SCH PATCH: 14 PATCH, EXTENDED RELEASE TRANSDERMAL at 14:00

## 2019-09-22 RX ADMIN — DIVALPROEX SODIUM SCH MG: 500 TABLET, FILM COATED, EXTENDED RELEASE ORAL at 21:17

## 2019-09-22 RX ADMIN — ACETAMINOPHEN PRN MG: 325 TABLET, FILM COATED ORAL at 01:50

## 2019-09-22 RX ADMIN — Medication SCH MG: at 21:17

## 2019-09-22 RX ADMIN — NICOTINE SCH: 14 PATCH, EXTENDED RELEASE TRANSDERMAL at 08:44

## 2019-09-22 NOTE — P.PN
Progress Note - Text


Progress Note Date: 09/22/19





Interval History:


Patient was seen this morning in group and was agreeable to speak to writer in 

the office. Patient is continuing to show improvement in his mood and states 

that he is "feeling calmer".  Patient is less tangential today however continues

to be thankful that he is on the unit and is "working through my stuff", when 

speaking about his mental illness and his medications along with his situation. 

Patient was thankful to writer several times.  He states that he is going to 

groups and is enjoying them.  He denies any depressive symptoms at this time and

denies any side effects from medications.   He states that he poorly last night 

as another patient was being disruptive in the hallways. At this time patient 

denies any suicidal or homical ideations, intent or plan. Patient denies any 

auditory, visual hallucinations and denies any paranoia or delusions. Patient 

denies any side effects from the medications and has been compliant with meds. 





Mental Status Exam:


General Appearance: Patient appears to be stated age is alert, pleasant, and 

cooperative. He is wearing street clothing and is neatly groomed.


Behavior: Patient is seated, with no agitation.


Speech: Patient's speech is fluent and improved with regards to his hyperverbal.




Mood/Affect: Patient reports their mood is "awesome", affect is congruent


Suicidality/Homicidality: Patient denies having any suicidal or homicidal 

ideation intent or plan.  


Perceptions: Patient denies any auditory or visual hallucinations.  


Though content/process: There is no evidence of any delusional thought content 

and thought process is tangential which has improved mildly.


Memory and concentration: AOX3, grossly intact for the purposes of this session.


Judgment and insight: Poor, improving mildly.





Assessment


Bipolar disorder severe currently in a manic episode


Cannabis use disorder


Alcohol use disorder


Nicotine dependence.





Plan:


-Patient continues to meet criteria for inpatient psychiatric admission for 

symptom stabilization and safety.  Patient did sign for adult voluntary form and

medication consent and is placed in patient's chart.


-Medications: Will continue with Abilify to 20 mg daily for mood stabilization. 

Continue with Depakote 500 mg +1000 mg daily at bedtime for mood stabilization


-When necessary Geodon and Ativan for agitation/aggression.


-NRT - nicotine patch


-SW on board for discharge planning.  Likely discharge back home early this 

week.  Patient was encouraged to have his mother come in to visit him over the 

weekend.

## 2019-09-23 RX ADMIN — FLUTICASONE PROPIONATE SCH SPRAY: 50 SPRAY, METERED NASAL at 08:46

## 2019-09-23 RX ADMIN — DIVALPROEX SODIUM SCH MG: 500 TABLET, FILM COATED, EXTENDED RELEASE ORAL at 20:58

## 2019-09-23 RX ADMIN — NICOTINE SCH: 14 PATCH, EXTENDED RELEASE TRANSDERMAL at 08:45

## 2019-09-23 RX ADMIN — Medication SCH MG: at 20:57

## 2019-09-23 RX ADMIN — DIVALPROEX SODIUM SCH MG: 500 TABLET, FILM COATED, EXTENDED RELEASE ORAL at 08:45

## 2019-09-23 NOTE — P.PN
Progress Note - Text


Progress Note Date: 09/23/19





Interval History:


Patient was seen this morning in group and was agreeable to speak to writer in 

the office. Patient is continuing to show improvement in his mood and is less 

hyperverbal and more goal oriented.  Patient continues to speak positively about

his bipolar disorder and how he needs to continue on treatment.  We spoke about 

his PPO set forth by his ex-wife in the custody hearing coming up however he 

remains positive and optimistic.  Patient was thankful to writer several times 

once again.  He states that he is going to groups and is enjoying them.  He 

denies any depressive symptoms at this time and denies any side effects from 

medications.   He states that he slept much better last night on the medications

however requested to have the Benadryl taken off.  At this time patient denies 

any suicidal or homical ideations, intent or plan. Patient denies any auditory, 

visual hallucinations and denies any paranoia or delusions. Patient denies any 

side effects from the medications and has been compliant with meds. 





Mental Status Exam:


General Appearance: Patient appears to be stated age is alert, pleasant, and 

cooperative. He is wearing street clothing and is neatly groomed.


Behavior: Patient is seated, with no agitation.


Speech: Patient's speech is fluent and improved with regards to his hyperverbal.




Mood/Affect: Patient reports their mood is "good", affect is congruent


Suicidality/Homicidality: Patient denies having any suicidal or homicidal 

ideation intent or plan.  


Perceptions: Patient denies any auditory or visual hallucinations.  


Though content/process: There is no evidence of any delusional thought content 

and thought process is tangential which has improved mildly.


Memory and concentration: AOX3, grossly intact for the purposes of this session.


Judgment and insight: Poor, improving mildly.





Assessment


Bipolar disorder severe currently in a manic episode


Cannabis use disorder


Alcohol use disorder


Nicotine dependence.





Plan:


-Patient continues to meet criteria for inpatient psychiatric admission for 

symptom stabilization and safety.  Patient did sign for adult voluntary form and

medication consent and is placed in patient's chart.


-Medications: Will continue with Abilify to 20 mg daily for mood stabilization. 

Switched Depakote to 1500 mg daily at bedtime for mood stabilization.  

Discontinued Benadryl at night.


-When necessary Geodon and Ativan for agitation/aggression.


-NRT - nicotine patch


-SW on board for discharge planning.  Likely discharge back home to parents 

tomorrow.

## 2019-09-24 RX ADMIN — DIVALPROEX SODIUM SCH MG: 500 TABLET, FILM COATED, EXTENDED RELEASE ORAL at 21:03

## 2019-09-24 RX ADMIN — Medication SCH MG: at 21:04

## 2019-09-24 RX ADMIN — FLUTICASONE PROPIONATE SCH SPRAY: 50 SPRAY, METERED NASAL at 08:58

## 2019-09-24 RX ADMIN — NICOTINE SCH: 14 PATCH, EXTENDED RELEASE TRANSDERMAL at 09:00

## 2019-09-24 NOTE — P.PN
Progress Note - Text


Progress Note Date: 09/24/19





Interval History:


Patient was seen this morning in group and was agreeable to speak to writer in 

the office. Patient is continuing to show improvement in his mood and is less 

hyperverbal and future oriented.  He states that yesterday he felt overwhelmed 

by the family meeting and states that his parents were not listening to him 

about how he wants to change his life.  He states that he feels a lot more 

comfortable and positive on his medications and feels to be "stable".  Patient 

continues to speak positively about his bipolar disorder and how he needs to 

continue on treatment.  Patient was thankful to writer several times once again.

 He states that he is going to groups and is enjoying them.  He denies any 

depressive symptoms at this time and denies any side effects from medications.  

He states that he slept much better last night on the medications and states 

that he tolerated the Abilify IM injection yesterday well.  At this time patient

denies any suicidal or homical ideations, intent or plan. Patient denies any 

auditory, visual hallucinations and denies any paranoia or delusions. Patient 

denies any side effects from the medications and has been compliant with meds. 





Mental Status Exam:


General Appearance: Patient appears to be stated age is alert, pleasant, and 

cooperative. He is wearing street clothing and is neatly groomed.


Behavior: Patient is seated, with no agitation.


Speech: Patient's speech is fluent with normal rate and tone.


Mood/Affect: Patient reports their mood is "stable", affect is congruent


Suicidality/Homicidality: Patient denies having any suicidal or homicidal 

ideation intent or plan.  


Perceptions: Patient denies any auditory or visual hallucinations.  


Though content/process: There is no evidence of any delusional thought content 

and thought process is tangential which has improved mildly.  Future and goal 

oriented.


Memory and concentration: AOX3, grossly intact for the purposes of this session.


Judgment and insight: Poor, improving mildly.





Assessment


Bipolar disorder severe currently in a manic episode


Cannabis use disorder


Alcohol use disorder


Nicotine dependence.





Plan:


-Patient continues to meet criteria for inpatient psychiatric admission for 

symptom stabilization and safety.  Patient did sign for adult voluntary form and

medication consent and is placed in patient's chart.


-Medications: Will continue with Abilify to 20 mg daily for mood stabilization. 

Continue with Depakote 1500 mg daily at bedtime for mood stabilization.  Patient

received Abilify Maintenna long-acting injection 400 mg dose on 9/23/2019 and 

will be due for this injection next on 10/21/2019.


-When necessary Geodon and Ativan for agitation/aggression.


-Valproic acid level came back at 82.


-NRT - nicotine patch


-SW on board for discharge planning. Likely discharge back home to parents 

tomorrow.

## 2019-09-25 VITALS
TEMPERATURE: 98.4 F | SYSTOLIC BLOOD PRESSURE: 152 MMHG | HEART RATE: 81 BPM | RESPIRATION RATE: 18 BRPM | DIASTOLIC BLOOD PRESSURE: 85 MMHG

## 2019-09-25 RX ADMIN — FLUTICASONE PROPIONATE SCH SPRAY: 50 SPRAY, METERED NASAL at 08:25

## 2019-09-25 RX ADMIN — NICOTINE SCH: 14 PATCH, EXTENDED RELEASE TRANSDERMAL at 08:25

## 2019-09-25 NOTE — P.DS
Providers


Date of admission: 


09/16/19 20:41





Expected date of discharge: 09/25/19


Attending physician: 


Danny Garcia MD





Consults: 





                                        





09/16/19 21:39


Consult Physician Routine 


   Consulting Provider: Augustin Beyer


   Consult Reason/Comments: H&P and medical


   Do you want consulting provider notified?: Yes











Primary care physician: 


Joan Trimble








- Discharge Diagnosis(es)


(1) Bipolar disorder with severe yolanda


Current Visit: Yes   Status: Acute   Priority: High   





(2) Cannabis abuse


Current Visit: Yes   Status: Acute   Priority: Medium   





(3) Alcohol use disorder


Current Visit: Yes   Status: Acute   Priority: Medium   





(4) Nicotine dependence


Current Visit: Yes   Status: Acute   Priority: Low   


Hospital Course: 





Admission HPI:


Patient is a 28-year-old  male who currently lives with his 4 kids in a

house in [place] and works as a cook. Patient presented to the hospital after

family insisted that he have a psychiatric evaluation.  Patient was transferred 

from [facility] to [facility].  In the ER patient had concerning 

behaviors for being manic and was therefore admitted to mental health unit.  She

was found to have a UDS positive for cannabis on admission.  Patient was 

agreeable to speak to writer in the office and appeared to be neatly groomed and

was directable.  Patient however was hyperverbal and tangential and grandiose 

with loosening of associations.  Patient spoke about having a "beautiful mood" 

and states that she wants to work here in the hospital and claimed that he can 

help people with dementia.  He listed off another patient on the unit and stated

that he can help people like her and when asked how he states "just by 

distracting her it's so easy".  Patient had racing thoughts flight of ideas 

spoke about studying hard and gaining knowledge.  Patient stated that his family

was getting worried about him and he claims that he stopped taking Dr. Franco's 

medications for his mood including Paxil and Xanax for months as he claims that 

his mom convinced him to stop and that he didn't need it.  He claims that since 

then he's been "self-medicating myself with booze and weed" and claims that she 

was drinking up to attend to 15 beers every other day and his last drink was 

over a month ago.  He states that he does have racing thoughts and feels 

"emotional".  He admitted to fair sleep last night and denies any depressive 

symptoms at this time.  He denies suicidal or homicidal ideations intent or 

plan.  At this time patient denies any auditory or visual hallucinations.  

Patient admits to using marijuana approximately 1-2 joints per night for sleep 

and anxiety however states that the marijuana was making him feel paranoid and 

more anxious so he stopped.  He admits to using cigarettes approximately three-

quarter pack per day.





Hospital course:


Upon admission to the unit patient was initially exhibiting manic behavior 

including intrusiveness, pressured speech or flight of ideas and grandiosity. 

Patient was however directable and agreeable to commence treatment. Patient got 

along well with other patients on the unit and followed unit protocol.  Patient 

was compliant with the medications and denied any side effects throughout 

hospital course.  Patient was started on Abilify which was titrated up to 20 mg 

daily for mood stabilization.  Patient was then given a Abilify Maintenna long-

acting injection 400 mg dose on 9/23/2019 to ensure compliance.  Patient will be

due for his next injection on 10/21/2019.  Patient was also started on Depakote 

ER and titrated up to 1500 mg nightly for mood stabilization.  Depakote level 

was drawn on 9/23/2019 which was 82.2.  Patient spoke of his stressors and 

engaged in therapy both group and individual.  Patient was also seen by medical 

team for history and physical exam.  Throughout the course of the hospitaliza

tion patient gradually improved with regards to manic symptoms, mood, anxiety, 

sleep and became future oriented with improved insight and judgment. On the day 

of discharge patient denied any suicidal or homicidal ideations intent or plan 

denied any auditory or visual hallucinations. Patient endorsed wanting to live 

for his health and his children along with restarting his career.  The patient 

denied any access to guns or weapons.  Patient denied any paranoia and did not 

endorse any delusions.  Patient does have a significant history of substance 

abuse and was counseled on abstaining from all substances including alcohol and 

marijuana.  Patient was offered substance use treatment however patient claimed 

that he would like to cut back on his own and states that "I'm done with all 

that stuff" and was encouraged to follow-up with [facility] for further substance use 

treatment in the future.  Patient was also counseled on the medications and need

for regular compliance and was encouraged to follow-up with their outpatient 

appointment for mental health and also for primary care.  Family meeting was 

completed by  and writer to answer any questions and ensure safety 

upon discharge.





Mental status exam:


General Appearance: Patient appears to be stated age is alert, pleasant, and 

cooperative. Patient is in no acute distress and has good hygiene and grooming 


Behavior: Patient is calmly seated without any agitated behavior.


Speech: Patient's speech is fluent and nonpressured. 


Mood/Affect: Patient reports their mood is "excellent ", affect is congruent and

euthymic. 


Suicidality/Homicidality:  Patient denies having any suicidal or homicidal 

ideation intent or plan.  


Perceptions: Patient denies any auditory or visual hallucinations.  


Though content/process: There is no evidence of any delusional thought content 

and thought process is linear and goal-directed.  Patient is future oriented.


Memory and concentration: AOX3, grossly intact for the purposes of this session.

Can spell "WORLD" backwards correctly.


Judgment and insight: fair, improved





Impression:


Bipolar disorder, severe manic episode


Cannabis use disorder


Alcohol use disorder


Nicotine dependence





Plan:


-Continue with discharge today as patient has improved and stabilized 

psychiatrically and is not currently an imminent threat to himself and/or 

others.


-Continue medications: Abilify by mouth 20 mg daily for mood stabilization will 

be continued for 12 days and then stopped.  Patient received Abilify Maintenna 

long-acting injection on 9/23/2019 and will be due for his next injection 1 

month from then on 10/21/2019.  We will provide a prescription for this.  

Patient to continue on Depakote ER 1500 mg nightly for mood stabilization.


-Patient was counseled on the need for medication compliance and appropriate 

follow-up at mental health and also primary care for medical issues.  Patient 

verbalized understanding and agreed.


-Social work and writer conducted a family meeting to ensure safety upon dis

charge and answer any questions/concerns. Social work also to arrange for 

patients follow up appointments at [facility] for psychiatric care and primary care 

provider.


-Patient counseled on abstaining from recreational drugs and marijuana and 

alcohol. Was informed/educated on the adverse effects on their physical and 

mental health.  Patient states that he will cut back on substance use on his own

and will be following up with [facility] for substance use treatment.


-Patient was instructed to return to the hospital or seek immediate medical care

if their psychiatric or medical systems do worsen or reoccur.








                                    Allergies











Allergy/AdvReac Type Severity Reaction Status Date / Time


 


No Known Allergies Allergy   Verified 09/16/19 13:45











                               Laboratory Results











WBC  7.7 k/uL (3.8-10.6)   09/17/19  07:42    


 


RBC  4.91 m/uL (4.30-5.90)   09/17/19  07:42    


 


Hgb  15.6 gm/dL (13.0-17.5)   09/17/19  07:42    


 


Hct  46.6 % (39.0-53.0)   09/17/19  07:42    


 


MCV  94.9 fL (80.0-100.0)   09/17/19  07:42    


 


MCH  31.8 pg (25.0-35.0)   09/17/19  07:42    


 


MCHC  33.5 g/dL (31.0-37.0)   09/17/19  07:42    


 


RDW  12.8 % (11.5-15.5)   09/17/19  07:42    


 


Plt Count  273 k/uL (150-450)   09/17/19  07:42    


 


Neutrophils %  59 %  09/17/19  07:42    


 


Lymphocytes %  26 %  09/17/19  07:42    


 


Monocytes %  8 %  09/17/19  07:42    


 


Eosinophils %  3 %  09/17/19  07:42    


 


Basophils %  1 %  09/17/19  07:42    


 


Neutrophils #  4.6 k/uL (1.3-7.7)   09/17/19  07:42    


 


Lymphocytes #  2.0 k/uL (1.0-4.8)   09/17/19  07:42    


 


Monocytes #  0.6 k/uL (0-1.0)   09/17/19  07:42    


 


Eosinophils #  0.2 k/uL (0-0.7)   09/17/19  07:42    


 


Basophils #  0.1 k/uL (0-0.2)   09/17/19  07:42    


 


Sodium  144 mmol/L (137-145)   09/17/19  07:42    


 


Potassium  4.6 mmol/L (3.5-5.1)   09/17/19  07:42    


 


Chloride  109 mmol/L ()  H  09/17/19  07:42    


 


Carbon Dioxide  22 mmol/L (22-30)   09/17/19  07:42    


 


Anion Gap  13 mmol/L  09/17/19  07:42    


 


BUN  12 mg/dL (9-20)   09/17/19  07:42    


 


Creatinine  0.96 mg/dL (0.66-1.25)   09/17/19  07:42    


 


Est GFR (CKD-EPI)AfAm  >90  (>60 ml/min/1.73 sqM)   09/17/19  07:42    


 


Est GFR (CKD-EPI)NonAf  >90  (>60 ml/min/1.73 sqM)   09/17/19  07:42    


 


Glucose  103 mg/dL (74-99)  H  09/17/19  07:42    


 


Estimated Ave Glu mg/dL  108   09/17/19  07:42    


 


Hemoglobin A1c  5.4 % (4.0-6.0)   09/17/19  07:42    


 


Calcium  9.6 mg/dL (8.4-10.2)   09/17/19  07:42    


 


Total Bilirubin  0.6 mg/dL (0.2-1.3)   09/17/19  07:42    


 


AST  48 U/L (17-59)   09/17/19  07:42    


 


ALT  37 U/L (21-72)   09/17/19  07:42    


 


Alkaline Phosphatase  69 U/L ()   09/17/19  07:42    


 


Total Protein  7.8 g/dL (6.3-8.2)   09/17/19  07:42    


 


Albumin  4.6 g/dL (3.5-5.0)   09/17/19  07:42    


 


Triglycerides  66 mg/dL (<150)   09/17/19  07:42    


 


Cholesterol  114 mg/dL (<200)   09/17/19  07:42    


 


LDL Cholesterol, Calc  53 mg/dL (0-99)   09/17/19  07:42    


 


HDL Cholesterol  48 mg/dL (40-60)   09/17/19  07:42    


 


TSH  2.030 mIU/L (0.465-4.680)   09/17/19  07:42    


 


Urine Color  Yellow   09/19/19  08:45    


 


Urine Appearance  Clear  (Clear)   09/19/19  08:45    


 


Urine pH  6.5  (5.0-8.0)   09/19/19  08:45    


 


Ur Specific Gravity  1.012  (1.001-1.035)   09/19/19  08:45    


 


Urine Protein  Negative  (Negative)   09/19/19  08:45    


 


Urine Glucose (UA)  Negative  (Negative)   09/19/19  08:45    


 


Urine Ketones  Negative  (Negative)   09/19/19  08:45    


 


Urine Blood  Negative  (Negative)   09/19/19  08:45    


 


Urine Nitrite  Negative  (Negative)   09/19/19  08:45    


 


Urine Bilirubin  Negative  (Negative)   09/19/19  08:45    


 


Urine Urobilinogen  <2.0 mg/dL (<2.0)   09/19/19  08:45    


 


Ur Leukocyte Esterase  Negative  (Negative)   09/19/19  08:45    


 


Urine Opiates Screen  Not Detected  (NotDetected)   09/16/19  14:08    


 


Ur Oxycodone Screen  Not Detected  (NotDetected)   09/16/19  14:08    


 


Urine Methadone Screen  Not Detected  (NotDetected)   09/16/19  14:08    


 


Ur Propoxyphene Screen  Not Detected  (NotDetected)   09/16/19  14:08    


 


Ur Barbiturates Screen  Not Detected  (NotDetected)   09/16/19  14:08    


 


Valproic Acid  82.2 ug/mL  09/23/19  08:00    


 


U Tricyclic Antidepress  Not Detected  (NotDetected)   09/16/19  14:08    


 


Ur Phencyclidine Scrn  Not Detected  (NotDetected)   09/16/19  14:08    


 


Ur Amphetamines Screen  Not Detected  (NotDetected)   09/16/19  14:08    


 


U Methamphetamines Scrn  Not Detected  (NotDetected)   09/16/19  14:08    


 


U Benzodiazepines Scrn  Not Detected  (NotDetected)   09/16/19  14:08    


 


Urine Cocaine Screen  Not Detected  (NotDetected)   09/16/19  14:08    


 


U Marijuana (THC) Screen  Detected  (NotDetected)  H  09/16/19  14:08    











                                   Vital Signs











Temp  98.4 F   09/25/19 05:35


 


Pulse  81   09/25/19 05:35


 


Resp  18   09/25/19 05:35


 


BP  152/85   09/25/19 05:35


 


Pulse Ox  98   09/16/19 21:00




















Patient Condition at Discharge: Stable





Plan - Discharge Summary


New Discharge Prescriptions: 


New


   ARIPiprazole [Abilify] 20 mg PO DAILY 12 Days  tab


   Divalproex ER [Depakote ER] 1,500 mg PO HS 28 Days  tab.er.24h


   Fluticasone Nasal Spray [Flonase Nasal Spray] 2 spray EA NOSTRIL DAILY 28 

Days  spr


   Nicotine 14Mg/24Hr Patch [Habitrol] 1 patch TRANSDERM DAILY 14 Days  patch


   Melatonin 5 mg PO HS 28 Days  tablet


Discharge Medication List





ARIPiprazole [Abilify] 20 mg PO DAILY 12 Days  tab 09/25/19 [Rx]


Divalproex ER [Depakote ER] 1,500 mg PO HS 28 Days  tab.er.24h 09/25/19 [Rx]


Fluticasone Nasal Spray [Flonase Nasal Spray] 2 spray EA NOSTRIL DAILY 28 Days  

spr 09/25/19 [Rx]


Melatonin 5 mg PO HS 28 Days  tablet 09/25/19 [Rx]


Nicotine 14Mg/24Hr Patch [Habitrol] 1 patch TRANSDERM DAILY 14 Days  patch 

09/25/19 [Rx]








Follow up Appointment(s)/Referral(s): 


Atilio Franco MD [Primary Care Provider] - 1-2 days


Activity/Diet/Wound Care/Special Instructions: 


Activity and diet as tolerated.  Avoid the use of street drugs and alcohol. Take

all medications as prescribed.  When you are in need of refills on your 

medications please contact your medical provider and/ or outpatient psychiatrist

to have this done.  Please go to scheduled outpatient appointment for aftercare.

 If symptoms return or become worse call the crisis line at [phone] 

and/or go to the nearest emergency room for evaluation. 


Discharge Disposition: HOME SELF-CARE

## 2021-09-18 ENCOUNTER — HOSPITAL ENCOUNTER (INPATIENT)
Dept: HOSPITAL 47 - EC | Age: 31
LOS: 3 days | Discharge: HOME | DRG: 885 | End: 2021-09-21
Attending: PSYCHIATRY & NEUROLOGY | Admitting: PSYCHIATRY & NEUROLOGY
Payer: COMMERCIAL

## 2021-09-18 DIAGNOSIS — F41.0: ICD-10-CM

## 2021-09-18 DIAGNOSIS — F31.30: Primary | ICD-10-CM

## 2021-09-18 DIAGNOSIS — R45.851: ICD-10-CM

## 2021-09-18 DIAGNOSIS — Z20.822: ICD-10-CM

## 2021-09-18 DIAGNOSIS — E07.9: ICD-10-CM

## 2021-09-18 DIAGNOSIS — R56.9: ICD-10-CM

## 2021-09-18 DIAGNOSIS — Z79.899: ICD-10-CM

## 2021-09-18 DIAGNOSIS — F12.10: ICD-10-CM

## 2021-09-18 DIAGNOSIS — F17.200: ICD-10-CM

## 2021-09-18 PROCEDURE — 82075 ASSAY OF BREATH ETHANOL: CPT

## 2021-09-18 PROCEDURE — 80053 COMPREHEN METABOLIC PANEL: CPT

## 2021-09-18 PROCEDURE — 85025 COMPLETE CBC W/AUTO DIFF WBC: CPT

## 2021-09-18 PROCEDURE — 87635 SARS-COV-2 COVID-19 AMP PRB: CPT

## 2021-09-18 PROCEDURE — 83036 HEMOGLOBIN GLYCOSYLATED A1C: CPT

## 2021-09-18 PROCEDURE — 84443 ASSAY THYROID STIM HORMONE: CPT

## 2021-09-18 PROCEDURE — 99285 EMERGENCY DEPT VISIT HI MDM: CPT

## 2021-09-18 PROCEDURE — 80061 LIPID PANEL: CPT

## 2021-09-18 NOTE — ED
Psych HPI





- General


Chief Complaint: Psychiatric Symptoms


Stated Complaint: EPS


Time Seen by Provider: 09/18/21 17:11


Source: patient


Mode of arrival: ambulatory





- History of Present Illness


Initial Comments: 


Danny is a 30-year-old male with a history of bipolar which was diagnosed 2 

years ago.  Patient was initially started on Depakote and then maintained on 

Abilify.  Patient states that at the time of diagnoses he is having yolanda.  He 

states that over the past few weeks seizures having severe depression.  Patient 

states he sleeping all day he feels no will to live he thinks about killing 

himself by overdosing on heroin.  Patient states he thinks he needs his 

medications adjusted he needs help with this depression.  No history of major 

depression or hospitalizations for depression past.  No history of self-harm.  

He does not have any firearms at home but he does have a history of drug use and

does have access to drugs which he states he would use to overdose.








- Related Data


                                Home Medications











 Medication  Instructions  Recorded  Confirmed


 


ARIPiprazole [Abilify] 10 mg PO DAILY 09/18/21 09/18/21











                                    Allergies











Allergy/AdvReac Type Severity Reaction Status Date / Time


 


No Known Allergies Allergy   Verified 09/18/21 18:33














Review of Systems


ROS Statement: 


Those systems with pertinent positive or pertinent negative responses have been 

documented in the HPI.





ROS Other: All systems not noted in ROS Statement are negative.





Past Medical History


Past Medical History: Thyroid Disorder


History of Any Multi-Drug Resistant Organisms: None Reported


Past Surgical History: Appendectomy, Hernia Repair


Past Psychological History: Anxiety


Smoking Status: Never smoker


Past Alcohol Use History: Occasional


Past Drug Use History: None Reported





General Exam





- General Exam Comments


Initial Comments: 


Physical Exam


GENERAL:


Patient is well-developed and well-nourished.  


Patient is nontoxic and well-hydrated and is in no distress.





HENT:


Normocephalic, Atraumatic. 





EYES:


PERRL, EOMI





PULMONARY:


Unlabored respirations.  





CARDIOVASCULAR:


RRR


Warm and well perfused extremities





ABDOMEN:


Non-distended





SKIN:


No rashes or bruising 





: 


Deferred





NEUROLOGIC:


Alert and oriented


Normal speech


Normal gait





MUSCULOSKELETAL:


Moving all extremities with no apparent injury 





PSYCHIATRIC:


Depressed, suicidal





Limitations: no limitations





Course


                                   Vital Signs











  09/18/21





  17:07


 


Temperature 98 F


 


Pulse Rate 102 H


 


Respiratory 20





Rate 


 


Blood Pressure 116/78


 


O2 Sat by Pulse 99





Oximetry 














Medical Decision Making





- Medical Decision Making


Patient was seen and evaluated, history was obtained from the patient 


Patient medically clear for EPS





Patient agrees to admission to inpatient psych








Disposition


Clinical Impression: 


 Bipolar disorder, Depression, Suicidal ideation





Disposition: TRANSFER TO PSYCH HOSP/UNIT


Condition: Stable


Referrals: 


Atilio Franco MD [Primary Care Provider] - 1-2 days

## 2021-09-19 VITALS — RESPIRATION RATE: 18 BRPM

## 2021-09-19 LAB
ALBUMIN SERPL-MCNC: 4.5 G/DL (ref 3.5–5)
ALP SERPL-CCNC: 58 U/L (ref 38–126)
ALT SERPL-CCNC: 24 U/L (ref 4–49)
ANION GAP SERPL CALC-SCNC: 9 MMOL/L
AST SERPL-CCNC: 26 U/L (ref 17–59)
BASOPHILS # BLD AUTO: 0.1 K/UL (ref 0–0.2)
BASOPHILS NFR BLD AUTO: 1 %
BUN SERPL-SCNC: 11 MG/DL (ref 9–20)
CALCIUM SPEC-MCNC: 10 MG/DL (ref 8.4–10.2)
CHLORIDE SERPL-SCNC: 106 MMOL/L (ref 98–107)
CHOLEST SERPL-MCNC: 176 MG/DL (ref 0–200)
CO2 SERPL-SCNC: 27 MMOL/L (ref 22–30)
EOSINOPHIL # BLD AUTO: 0.2 K/UL (ref 0–0.7)
EOSINOPHIL NFR BLD AUTO: 3 %
ERYTHROCYTE [DISTWIDTH] IN BLOOD BY AUTOMATED COUNT: 5.04 M/UL (ref 4.3–5.9)
ERYTHROCYTE [DISTWIDTH] IN BLOOD: 12.3 % (ref 11.5–15.5)
GLUCOSE BLD-MCNC: 96 MG/DL (ref 75–99)
GLUCOSE SERPL-MCNC: 96 MG/DL (ref 74–99)
HBA1C MFR BLD: 5.2 % (ref 4–6)
HCT VFR BLD AUTO: 48.3 % (ref 39–53)
HDLC SERPL-MCNC: 36 MG/DL (ref 40–60)
HGB BLD-MCNC: 15.6 GM/DL (ref 13–17.5)
LDLC SERPL CALC-MCNC: 110.8 MG/DL (ref 0–131)
LYMPHOCYTES # SPEC AUTO: 2.3 K/UL (ref 1–4.8)
LYMPHOCYTES NFR SPEC AUTO: 33 %
MCH RBC QN AUTO: 31 PG (ref 25–35)
MCHC RBC AUTO-ENTMCNC: 32.4 G/DL (ref 31–37)
MCV RBC AUTO: 95.7 FL (ref 80–100)
MONOCYTES # BLD AUTO: 0.6 K/UL (ref 0–1)
MONOCYTES NFR BLD AUTO: 8 %
NEUTROPHILS # BLD AUTO: 3.7 K/UL (ref 1.3–7.7)
NEUTROPHILS NFR BLD AUTO: 54 %
PLATELET # BLD AUTO: 222 K/UL (ref 150–450)
POTASSIUM SERPL-SCNC: 4.4 MMOL/L (ref 3.5–5.1)
PROT SERPL-MCNC: 7.6 G/DL (ref 6.3–8.2)
SODIUM SERPL-SCNC: 142 MMOL/L (ref 137–145)
TRIGL SERPL-MCNC: 146 MG/DL (ref 0–149)
VLDLC SERPL CALC-MCNC: 29.2 MG/DL (ref 5–40)
WBC # BLD AUTO: 7 K/UL (ref 3.8–10.6)

## 2021-09-19 RX ADMIN — NICOTINE POLACRILEX PRN MG: 2 GUM, CHEWING BUCCAL at 12:56

## 2021-09-19 RX ADMIN — SERTRALINE HYDROCHLORIDE SCH MG: 25 TABLET ORAL at 14:18

## 2021-09-19 RX ADMIN — NICOTINE SCH: 14 PATCH, EXTENDED RELEASE TRANSDERMAL at 08:08

## 2021-09-19 RX ADMIN — NICOTINE POLACRILEX PRN MG: 2 GUM, CHEWING BUCCAL at 17:29

## 2021-09-19 NOTE — P.CONS
History of Present Illness





- Reason for Consult


Consult date: 09/19/21


Medical management





- Chief Complaint


Depression





- History of Present Illness





 30-year-old male with a history of bipolar which was diagnosed 2 years ago.  

Patient was initially started on Depakote and then maintained on Abilify.  

Patient states that at the time of diagnoses he is having yolanda.  He states that

over the past few weeks seizures having severe depression.  Patient states he 

sleeping all day he feels no will to live he thinks about killing himself by 

overdosing on heroin.  Patient states he thinks he needs his medications 

adjusted he needs help with this depression.  No history of major depression or 

hospitalizations for depression past.  No history of self-harm.  He does not h

ave any firearms at home but he does have a history of drug use and does have 

access to drugs which he states he would use to overdose.


Patient denies any history of hypertension, hyperlipidemia diabetes; according 

to records patient does have history of thyroid disorder; patient denies having 

any knowledge of any thyroid issues





Review of Systems











REVIEW OF SYSTEMS: 


CONSTITUTIONAL: No fever, no malaise, no fatigue. 


HEENT: No recent visual problems or hearing problems. Denied any sore throat. 


CARDIOVASCULAR: No chest pain, orthopnea, PND, no palpitations, no syncope. 


PULMONARY: No shortness of breath, no cough, no hemoptysis. 


GASTROINTESTINAL: No diarrhea, no nausea, no vomiting, no abdominal pain. 


NEUROLOGICAL: No headaches, no weakness, no numbness. 


HEMATOLOGICAL: Denies any bleeding or petechiae. 


GENITOURINARY: Denies any burning micturition, frequency, or urgency. 


MUSCULOSKELETAL/RHEUMATOLOGICAL: Denies any joint pain, swelling, or any muscle 

pain. 


ENDOCRINE: Denies any polyuria or polydipsia. 





The rest of the 14-point review of systems is negative.





Past Medical History


Past Medical History: Thyroid Disorder


History of Any Multi-Drug Resistant Organisms: None Reported


Past Surgical History: Appendectomy, Hernia Repair


Past Psychological History: Anxiety


Smoking Status: Never smoker


Past Alcohol Use History: Occasional


Additional Past Alcohol Use History / Comment(s): marijuana


Past Drug Use History: None Reported





Medications and Allergies


                                Home Medications











 Medication  Instructions  Recorded  Confirmed  Type


 


ARIPiprazole [Abilify] 10 mg PO DAILY 09/18/21 09/18/21 History








                                    Allergies











Allergy/AdvReac Type Severity Reaction Status Date / Time


 


No Known Allergies Allergy   Verified 09/18/21 22:17














Physical Exam


Vitals: 


                                   Vital Signs











  Temp Pulse Pulse Resp BP BP Pulse Ox


 


 09/19/21 07:45    60    102/69 


 


 09/19/21 07:10  98.4 F   60  18   107/59 


 


 09/18/21 21:44  98.6 F   78  16   113/78  98


 


 09/18/21 17:07  98 F  102 H   20  116/78   99








                                Intake and Output











 09/18/21 09/19/21 09/19/21





 22:59 06:59 14:59


 


Other:   


 


  Weight 76.2 kg  78.9 kg














- Constitutional


General appearance: Present: average body habitus, cooperative, no acute 

distress


- EENT


Eyes: Present: anicteric sclerae, EOMI, PERRLA, normal appearance


ENT: Present: hearing grossly normal, normal oropharynx


Ears: bilateral: normal


- Neck


Neck: Present: normal ROM.  Absent: lymphadenopathy, rigidity, thyromegaly


Carotids: negative: bruit present


Thyroid: bilateral: normal size, negative: enlarged, nodule


- Respiratory


Respiratory: bilateral: CTA, negative: rales, rhonchi, wheezing


- Cardiovascular


Rhythm: regular


Heart sounds: normal: S1, S2


Abnormal Heart Sounds: Absent: systolic murmur, diastolic murmur


- Gastrointestinal


General gastrointestinal: Present: normal bowel sounds, soft.  Absent: 

distended, organomegaly, tenderness


- Genitourinary


Genitourinary Comment(s): deferred


- Integumentary


Integumentary: Present: normal turgor.  Absent: jaundiced, rash, ulcer


- Neurologic


Neurologic: Present: CNII-XII intact.  Absent: focal deficits


- Musculoskeletal


Musculoskeletal: Present: gait normal, strength equal bilaterally


- Psychiatric


Psychiatric: Present: A&O x's 3, appropriate affect, intact judgment & insight








Results


CBC & Chem 7: 


                                 09/19/21 07:23





                                 09/19/21 07:23





Assessment and Plan


Assessment: 





1.  Manic depressive disorder; your management





2.  History of thyroid disorders; patient currently not on any thyroid 

medications; denies any knowledge of any thyroid issues; we will order T4 and 

TSH with further recommendations pending test results





3.  Tobacco abuse; patient uses nicotine gum





DVT prophylaxis; ambulation


CODE STATUS; full code

## 2021-09-19 NOTE — P.HP
Psychiatric H&P





- .


H&P Date: 09/19/21


History & Physical: 





      


IDENTIFYING Data:


Mr. Danny Yen is a 30-year-old single  male who currently lives with

the mother of his children, his 4 children, employed, has psychiatric history of

bipolar disorder, and denies any medical history. The patient has been admitted 

to our inpatient psychiatric services after been transferred from [facility]. Patient was initially self-referred to ED for psychiatric evaluation 

because of worsened depression and suicidal ideation.  The patient has been 

admitted on voluntary basis to our service. 





CHIEF COMPLAINT:  


"I want to start antidepressant."





HISTORY OF PRESENT ILLNESS:


The patient with history of bipolar disorder who was admitted to this unit 2 

years ago, discharged with diagnosis of bipolar and was prescribed medications 

at that time Abilify and Depakote.  Patient reports having severe depression 

over the past few months and is started to have suicidal ideation over the past 

a few weeks was constant feeling of hopelessness and suicidal ideation for the 

last 2 weeks.  Reports excessive sleeping with lack of motivation, diminished 

pleasure, feeling hopeless, and constantly having thoughts of death and killing 

himself by overdosing on heroin.  He brought himself to the hospital for 

medication management and wants to start antidepressant.  Patient denies any 

history of self-harm or previous suicidal attempts.


He reports this time of the year anniversary of his father death who passed away

last year, and that caused him to feel more depressed and suicidal.  Denies any 

appetite problem.  Reports increased anxiety with racing thoughts and sometimes 

having panic attacks.


Patient denies any manic episodes since last time he was admitted to this unit 2

years ago and reports Abilify to help with with the yolanda.  He followed with [facility]

for some time after discharged from this unit 2 years ago but he didn't see any 

psychiatrist for almost 1 year, and he reports he was taken off Depakote as per 

[facility] recommendations.


Patient denies any current or previous symptoms of psychosis including 

hallucinations, paranoid ideation, or delusions.








PAST PSYCHIATRIC HISTORY: 


Previous diagnoses: Bipolar disorder


Previous psychiatric hospitalizations: One previous hospitalization at this unit

in 2019. 


Previous suicide attempts: Denies.


Previous outpatient psychiatric treatment: Patient used to follow up with [facility] 

but he didn't see any psychiatrist for the last year. 


Current psychiatric medications: Abilify 10 mg daily.  


Previous medication trials: Depakote which he stopped according to [facility] 

psychiatrist recommendations as per the patient's report.





SUBSTANCE ABUSE HISTORY:


Nicotine: Smokes 1 pack of cigarettes daily. 


Alcohol: Denies using alcohol, but according to previous records the patient was

diagnosed with alcohol use disorder.


Reports occasionally smokes marijuana and he denies any other street drug use.  

Denies any history of previous treatment for substance use disorder.  Denies any

history of IVDU 





Social History:


The patient currently lives with the mother of his 4 children, works as a cook, 

completed GED.


He was raised up by his parents, denies any history of childhood abuse, and he 

denies any learning problem during school time.





FAMILY HISTORY:


Denies any family history of mental illness, addiction, or suicide





Medical History: 


None Reported





MENTAL STATUS EVALUATION:


Appearance: Appears stated age, groomed, average body built, and no specific 

features. 


Gait/ posture: Steady gait, normal arm swinging, no abnormal movements, with 

relaxed posture. 


Attitude and Behavior: Engaged, related to the interviewer in socially accepted 

manner, fair eye contact during course of interview.  


Motor Activity: Decreased psychomotor activity.   


Speech: spontaneous, Slow rate, rhythm, and articulation. Low volume. Not 

pressured. 


Language: Articulating, naming objects and repeat phrases. 


Mood: Depressed


Affect: Restricted.


Thought process: Linear, goal directed.


Association: Intact. 


Thought content: No delusions, Reports suicidal thoughts, no homicidal thoughts,

reports intention and plan to overdose on drugs. 


Perception: Denies any hallucinations 


Alertness: No impairment.  


Concentration: Fair


Orientation: Oriented to time, place, person and situation


Insight regarding psychiatric condition: Fair


Judgment regarding daily activities and social situation: Fair


Impulse control: Fair





Strengths: 


Stable general medical condition.


Housing.


Employed








Challenges: 


Limited social support.


Poor compliance with outpatient treatment














                                    Allergies











Allergy/AdvReac Type Severity Reaction Status Date / Time


 


No Known Allergies Allergy   Verified 09/18/21 22:17








                                   Vital Signs











Temp  98.4 F   09/19/21 07:10


 


Pulse  60   09/19/21 07:45


 


Resp  18   09/19/21 07:10


 


BP  102/69   09/19/21 07:45


 


Pulse Ox  98   09/18/21 21:44








                                 Intake & Output











 09/18/21 09/19/21 09/19/21





 18:59 06:59 18:59


 


Weight 77.111 kg 76.2 kg 78.9 kg





Review of Lab results: 








                             Laboratory Last Values











WBC  7.0 k/uL (3.8-10.6)   09/19/21  07:23    


 


RBC  5.04 m/uL (4.30-5.90)   09/19/21  07:23    


 


Hgb  15.6 gm/dL (13.0-17.5)   09/19/21  07:23    


 


Hct  48.3 % (39.0-53.0)   09/19/21  07:23    


 


MCV  95.7 fL (80.0-100.0)   09/19/21  07:23    


 


MCH  31.0 pg (25.0-35.0)   09/19/21  07:23    


 


MCHC  32.4 g/dL (31.0-37.0)   09/19/21  07:23    


 


RDW  12.3 % (11.5-15.5)   09/19/21  07:23    


 


Plt Count  222 k/uL (150-450)   09/19/21  07:23    


 


MPV  7.6   09/19/21  07:23    


 


Neutrophils %  54 %  09/19/21  07:23    


 


Lymphocytes %  33 %  09/19/21  07:23    


 


Monocytes %  8 %  09/19/21  07:23    


 


Eosinophils %  3 %  09/19/21  07:23    


 


Basophils %  1 %  09/19/21  07:23    


 


Neutrophils #  3.7 k/uL (1.3-7.7)   09/19/21  07:23    


 


Lymphocytes #  2.3 k/uL (1.0-4.8)   09/19/21  07:23    


 


Monocytes #  0.6 k/uL (0-1.0)   09/19/21  07:23    


 


Eosinophils #  0.2 k/uL (0-0.7)   09/19/21  07:23    


 


Basophils #  0.1 k/uL (0-0.2)   09/19/21  07:23    


 


Sodium  142 mmol/L (137-145)   09/19/21  07:23    


 


Potassium  4.4 mmol/L (3.5-5.1)   09/19/21  07:23    


 


Chloride  106 mmol/L ()   09/19/21  07:23    


 


Carbon Dioxide  27 mmol/L (22-30)   09/19/21  07:23    


 


Anion Gap  9 mmol/L  09/19/21  07:23    


 


BUN  11 mg/dL (9-20)   09/19/21  07:23    


 


Creatinine  1.19 mg/dL (0.66-1.25)   09/19/21  07:23    


 


Est GFR (CKD-EPI)AfAm  >90  (>60 ml/min/1.73 sqM)   09/19/21  07:23    


 


Est GFR (CKD-EPI)NonAf  81  (>60 ml/min/1.73 sqM)   09/19/21  07:23    


 


Glucose  96 mg/dL (74-99)   09/19/21  07:23    


 


POC Glucose (mg/dL)  96 mg/dL (75-99)   09/19/21  07:28    


 


POC Glu Operater ID  Odalys Rey   09/19/21  07:28    


 


Calcium  10.0 mg/dL (8.4-10.2)   09/19/21  07:23    


 


Total Bilirubin  0.6 mg/dL (0.2-1.3)   09/19/21  07:23    


 


AST  26 U/L (17-59)   09/19/21  07:23    


 


ALT  24 U/L (4-49)   09/19/21  07:23    


 


Alkaline Phosphatase  58 U/L ()   09/19/21  07:23    


 


Total Protein  7.6 g/dL (6.3-8.2)   09/19/21  07:23    


 


Albumin  4.5 g/dL (3.5-5.0)   09/19/21  07:23    


 


Triglycerides  146.0 mg/dL (0.0-149.0)   09/19/21  07:23    


 


Cholesterol  176 mg/dL (0-200)   09/19/21  07:23    


 


LDL Cholesterol, Calc  110.8 mg/dL (0.0-131.0)   09/19/21  07:23    


 


VLDL Cholesterol, Calc  29.20 mg/dL (5.00-40.00)   09/19/21  07:23    


 


HDL Cholesterol  36.0 mg/dL (40.0-60.0)  L  09/19/21  07:23    


 


Cholesterol/HDL Ratio  4.89   09/19/21  07:23    


 


TSH  1.920 mIU/L (0.465-4.680)   09/19/21  07:23    


 


Coronavirus (PCR)  Not Detected  (Not Detectd)   09/18/21  19:16    














Assessment:


Bipolar disorder type I, most recent episode depressive area


Cannabis use disorder, mild.


Nicotine use disorder.


Rule out alcohol use disorder.





TREATMENT PLAN/RECOMMENDATIONS:


Medical Decision making: The patient presented with depression and suicidal 

ideation. The patient at high risk to harm himself if he is not in the inpatient

setting.  The patient's psychiatric symptoms are not stable and the patient 

needs further management of psychiatric medications and further planning for 

discharge. Therefore, inpatient level of care is needed.   Continue the patient 

inpatient for safety.  Continue the patient under 15 minutes safe check for 

safety.





Continue treatment of bipolar disorder including depressive symptoms and mood 

stabilization.


Psych education regarding his diagnosis, and treatment option.  


The patient will also be provided with individual therapy, group therapy, 

substance abuse counseling, gain insight, and coping skills. 


Consider medical consultation if any acute medical issue arise.  





Medications:


Abilify 10 mg daily for mood stabilization.


Start Zoloft 25 mg daily for depression and anxiety symptoms.


Nicotine replacement therapy.


Continue as needed psychiatric medications for agitation and anxiety.





EXPECTED LENGTH OF STAY:  57 days.











09/19/21 13:22

## 2021-09-20 VITALS — DIASTOLIC BLOOD PRESSURE: 55 MMHG | TEMPERATURE: 98.8 F | SYSTOLIC BLOOD PRESSURE: 105 MMHG | HEART RATE: 67 BPM

## 2021-09-20 RX ADMIN — NICOTINE POLACRILEX PRN MG: 2 GUM, CHEWING BUCCAL at 20:00

## 2021-09-20 RX ADMIN — SERTRALINE HYDROCHLORIDE SCH MG: 25 TABLET ORAL at 08:14

## 2021-09-20 RX ADMIN — NICOTINE POLACRILEX PRN MG: 2 GUM, CHEWING BUCCAL at 08:14

## 2021-09-20 RX ADMIN — NICOTINE SCH: 14 PATCH, EXTENDED RELEASE TRANSDERMAL at 08:13

## 2021-09-20 NOTE — P.PN
Progress Note - Text


Progress Note Date: 09/20/21





Interval History:


Patient was seen in group and was directable and agreeable to speak with writer 

in the office.  The patient reports that he is feeling.  He is not reporting any

delusions.  The patient reports that he feels like it was the one-year 

anniversary of his father's death that triggered this current episode.  He 

reports that prior to coming into this hospital, he was experiencing increased 

depression, decreased energy, excessive fatigue, as well as suicidal ideation.  

He reports that he did not an attempt prior to this admission.  The patient has 

been attending group with good participation.  The patient was continued on his 

home medication Abilify was started on Zoloft for management of his depression, 

anxiety, and bipolar disorder.  He is open to increasing his Zoloft today.  He 

is looking forward to returning to work this week.





Mental Status Exam:


General Appearance: Patient appears to be stated age is alert, directable, and 

cooperative. 


Behavior: Patient is calmly seated without any agitated behavior.  Eye contact 

is appropriate.


Speech: Patient's speech is fluent and nonpressured.  Spontaneous, normal rate, 

tone, and volume.


Mood/Affect: Mood is improving mildly, affect is congruent and constricted. 


Suicidality/Homicidality:  Patient denies having any suicidal or homicidal 

ideation intent or plan.  


Perceptions: Patient denies any visual hallucinations and denies any auditory 

hallucinations  


Though content/process: There is no evidence of any delusional thought content 

and thought process is linear and goal-directed. 


Memory and concentration: AOX3, grossly intact for the purposes of this session


Judgment and insight: Improving mildly





                                   Vital Signs











Temp  98.8 F   09/20/21 06:29


 


Pulse  67   09/20/21 06:29


 


Resp  18   09/20/21 06:29


 


BP  105/55   09/20/21 06:29


 


Pulse Ox  98   09/18/21 21:44








                                 Intake & Output











 09/19/21 09/20/21 09/20/21





 18:59 06:59 18:59


 


Weight 78.9 kg  








                       Laboratory Results - Last 24 Hours











  09/19/21 09/19/21





  07:23 07:23


 


Estimated Ave Glu mg/dL  103 


 


Hemoglobin A1c  5.2 


 


Triglycerides   146.0


 


Cholesterol   176


 


LDL Cholesterol, Calc   110.8


 


VLDL Cholesterol, Calc   29.20


 


HDL Cholesterol   36.0 L


 


Cholesterol/HDL Ratio   4.89











Assessment


Bipolar disorder, type I depressive disorder


Cannabis use disorder, mild


Nicotine dependence





Plan:


-Patient continues to meet criteria for inpatient psychiatric admission for 

symptom stabilization and safety. Patient has signed adult voluntary form and 

medication consent and was placed in patient's chart.


-Medications: 


Continue Abilify 10 mg by mouth daily for mood stabilization


Increase Zoloft to 50 mg by mouth daily for depression and anxiety


-When necessary Ativan and Haldol for agitation/aggression.


-NRT - nicotine patch


-SW on board for discharge planning.  Encouraged the patient to participate in 

milieu.

## 2021-09-21 RX ADMIN — NICOTINE SCH: 14 PATCH, EXTENDED RELEASE TRANSDERMAL at 07:48

## 2021-09-21 NOTE — P.DS
Providers


Date of admission: 


09/18/21 20:14





Expected date of discharge: 09/21/21


Attending physician: 


Harjit Springer MD





Consults: 





                                        





09/18/21 20:16


Consult Physician Routine 


   Consulting Provider: Augustin Beyer


   Consult Reason/Comments: medical management


   Do you want consulting provider notified?: Yes











Primary care physician: 


Joan Ardonbal








- Discharge Diagnosis(es)


(1) Bipolar depression


Status: Acute   Priority: High   





(2) Cannabis abuse


Status: Chronic   Priority: Medium   





(3) Nicotine dependence


Status: Chronic   Priority: Medium   


Hospital Course: 





Admission HPI:


Initial psychiatric evaluation was completed by Dr. Evans on 09/19/21 who 

wrote:





"Mr. Danny Yen is a 30-year-old single  male who currently lives 

with the mother of his children, his 4 children, employed, has psychiatric 

history of bipolar disorder, and denies any medical history. The patient has 

been admitted to our inpatient psychiatric services after been transferred from 

[facility]. Patient was initially self-referred to ED for psychiatric

evaluation because of worsened depression and suicidal ideation.  The patient 

has been admitted on voluntary basis to our service. 





'I want to start antidepressant.'





The patient with history of bipolar disorder who was admitted to this unit 2 

years ago, discharged with diagnosis of bipolar and was prescribed medications 

at that time Abilify and Depakote.  Patient reports having severe depression 

over the past few months and is started to have suicidal ideation over the past 

a few weeks was constant feeling of hopelessness and suicidal ideation for the 

last 2 weeks.  Reports excessive sleeping with lack of motivation, diminished 

pleasure, feeling hopeless, and constantly having thoughts of death and killing 

himself by overdosing on heroin.  He brought himself to the hospital for 

medication management and wants to start antidepressant.  Patient denies any 

history of self-harm or previous suicidal attempts.


He reports this time of the year anniversary of his father death who passed away

last year, and that caused him to feel more depressed and suicidal.  Denies any 

appetite problem.  Reports increased anxiety with racing thoughts and sometimes 

having panic attacks.


Patient denies any manic episodes since last time he was admitted to this unit 2

years ago and reports Abilify to help with with the yolanda.  He followed with [facility]

for some time after discharged from this unit 2 years ago but he didn't see any 

psychiatrist for almost 1 year, and he reports he was taken off Depakote as per 

[facility] recommendations.


Patient denies any current or previous symptoms of psychosis including 

hallucinations, paranoid ideation, or delusions.





Previous diagnoses: Bipolar disorder


Previous psychiatric hospitalizations: One previous hospitalization at this unit

in 2019. 


Previous suicide attempts: Denies.


Previous outpatient psychiatric treatment: Patient used to follow up with [facility] 

but he didn't see any psychiatrist for the last year. 


Current psychiatric medications: Abilify 10 mg daily.  


Previous medication trials: Depakote which he stopped according to [facility] 

psychiatrist recommendations as per the patient's report."





Hospital course:


Upon admission to the unit patient was initially presenting well but endorsing 

suicidal thoughts. Patient was however directable and agreeable to commence 

treatment. Patient got along well with other patients on the unit and followed 

unit protocol.  Patient was compliant with the medications and denied any side 

effects throughout hospital course.  Patient was started on his home medication 

of Abilify and Zoloft was added to address depression and anxiety symptoms.  

Patient spoke of his stressors and engaged in therapy both group and individual.

 Patient was also seen by medical team for history and physical exam.  The 

patient's Zoloft was gradually titrated to final dose of 50 mg daily.  

Throughout the course of the hospitalization patient gradually improved with 

regards to mood, anxiety, sleep, and appetite.  He became more future oriented 

and developed better insight and judgment.  On the day of discharge, the patient

is not endorsing any suicidal or homicidal ideation, intention, and/or plan.  He

is denying any auditory or visual hallucinations.  He reports no access to 

firearms or other weapons.  He reports wanting to live for himself and for his 

family. Patient does not have a significant history of substance abuse however 

was counseled on abstaining from all substances including alcohol and marijuana.

Patient was also counseled on the medications and need for regular compliance 

and was encouraged to follow-up with their outpatient appointment for mental 

health and also for primary care.  Prior to discharge a family meeting will be 

arranged by  to answer any questions and ensure safety upon 

discharge.





Mental status exam:


General Appearance: Patient appears to be stated age is alert, pleasant, and 

cooperative. Patient is in no acute distress and has fair hygiene and grooming 


Behavior: Patient is calmly seated without any agitated behavior.


Speech: Patient's speech is fluent and nonpressured. 


Mood/Affect: Patient reports their mood is "feeling really good", affect is 

congruent and euthymic. 


Suicidality/Homicidality:  Patient denies having any suicidal or homicidal 

ideation intent or plan.  


Perceptions: Patient denies any auditory or visual hallucinations.  


Though content/process: There is no evidence of any delusional thought content 

and thought process is linear and goal-directed.  Patient is future oriented. 


Memory and concentration: AOX3, grossly intact for the purposes of this session.

Can spell "WORLD" backwards correctly.


Judgment and insight: Improved 








                                   Vital Signs











Temp  98.8 F   09/20/21 06:29


 


Pulse  67   09/20/21 06:29


 


Resp  18   09/20/21 06:29


 


BP  105/55   09/20/21 06:29


 


Pulse Ox  98   09/18/21 21:44














Impression:


Bipolar disorder, type I, depressed episode


Cannabis use disorder, mild


Nicotine dependence





Plan:


-Continue with discharge today as patient has improved and stabilized 

psychiatrically and is not currently an imminent threat to himself and/or 

others.


-Continue medications: 


Zoloft 50 mg daily for depression and anxiety


Abilify 10 mg by mouth daily for mood stabilization


-Patient was counseled on the need for medication compliance and appropriate 

follow-up at mental health and also primary care for medical issues.  Patient 

verbalized understanding and agreed.


-Social work to arrange for and conduct family meeting to ensure safety upon 

discharge and answer any questions/concerns. Social work also to arrange for 

patients follow up appointments for psychiatric care along with follow up with 

primary care provider.


-Patient counseled on abstaining from recreational drugs and marijuana and 

alcohol. Was informed/educated on the adverse effects on their physical and 

mental health. Patient verbally agreed and understood. 


-Patient was instructed to return to the hospital or seek immediate medical care

if their psychiatric or medical symptoms do worsen or reoccur.


-Psychoeducation and supportive therapy provided to patient.  Risks and benefits

of pharmacological treatment versus the risks and benefits of nontreatment 

weight and discussed.  Informed consent discussion held.  Common side effects of

psychotropics discussed such as, but not limited to headache, GI disturbance, 

sexual dysfunction, movement disorders, sedation, and orthostatic hypotension.  

Life threatening and blackbox warnings of prescribed medications also discussed.

 Potential risks of operating a vehicle or heavy machinery discussed with 

patient at length.  Advised on importance of compliance and a reliable and 

responsible manner. Patient advised to review FDA consumer labeling of all 

medications prior to taking.  Patient verbalized understanding of potential 

risks, and agrees with current treatment plan.  Patient advised to medically 

contact physician/emergency personnel if any acute changes in condition occur.








                                    Allergies











Allergy/AdvReac Type Severity Reaction Status Date / Time


 


No Known Allergies Allergy   Verified 09/18/21 22:17

















                               Laboratory Results











WBC  7.0 k/uL (3.8-10.6)   09/19/21  07:23    


 


RBC  5.04 m/uL (4.30-5.90)   09/19/21  07:23    


 


Hgb  15.6 gm/dL (13.0-17.5)   09/19/21  07:23    


 


Hct  48.3 % (39.0-53.0)   09/19/21  07:23    


 


MCV  95.7 fL (80.0-100.0)   09/19/21  07:23    


 


MCH  31.0 pg (25.0-35.0)   09/19/21  07:23    


 


MCHC  32.4 g/dL (31.0-37.0)   09/19/21  07:23    


 


RDW  12.3 % (11.5-15.5)   09/19/21  07:23    


 


Plt Count  222 k/uL (150-450)   09/19/21  07:23    


 


MPV  7.6   09/19/21  07:23    


 


Neutrophils %  54 %  09/19/21  07:23    


 


Lymphocytes %  33 %  09/19/21  07:23    


 


Monocytes %  8 %  09/19/21  07:23    


 


Eosinophils %  3 %  09/19/21  07:23    


 


Basophils %  1 %  09/19/21  07:23    


 


Neutrophils #  3.7 k/uL (1.3-7.7)   09/19/21  07:23    


 


Lymphocytes #  2.3 k/uL (1.0-4.8)   09/19/21  07:23    


 


Monocytes #  0.6 k/uL (0-1.0)   09/19/21  07:23    


 


Eosinophils #  0.2 k/uL (0-0.7)   09/19/21  07:23    


 


Basophils #  0.1 k/uL (0-0.2)   09/19/21  07:23    


 


Sodium  142 mmol/L (137-145)   09/19/21  07:23    


 


Potassium  4.4 mmol/L (3.5-5.1)   09/19/21  07:23    


 


Chloride  106 mmol/L ()   09/19/21  07:23    


 


Carbon Dioxide  27 mmol/L (22-30)   09/19/21  07:23    


 


Anion Gap  9 mmol/L  09/19/21  07:23    


 


BUN  11 mg/dL (9-20)   09/19/21  07:23    


 


Creatinine  1.19 mg/dL (0.66-1.25)   09/19/21  07:23    


 


Est GFR (CKD-EPI)AfAm  >90  (>60 ml/min/1.73 sqM)   09/19/21  07:23    


 


Est GFR (CKD-EPI)NonAf  81  (>60 ml/min/1.73 sqM)   09/19/21  07:23    


 


Glucose  96 mg/dL (74-99)   09/19/21  07:23    


 


POC Glucose (mg/dL)  96 mg/dL (75-99)   09/19/21  07:28    


 


POC Glu Operater ID  [place]   09/19/21  07:28    


 


Estimated Ave Glu mg/dL  103   09/19/21  07:23    


 


Hemoglobin A1c  5.2 % (4.0-6.0)   09/19/21  07:23    


 


Calcium  10.0 mg/dL (8.4-10.2)   09/19/21  07:23    


 


Total Bilirubin  0.6 mg/dL (0.2-1.3)   09/19/21  07:23    


 


AST  26 U/L (17-59)   09/19/21  07:23    


 


ALT  24 U/L (4-49)   09/19/21  07:23    


 


Alkaline Phosphatase  58 U/L ()   09/19/21  07:23    


 


Total Protein  7.6 g/dL (6.3-8.2)   09/19/21  07:23    


 


Albumin  4.5 g/dL (3.5-5.0)   09/19/21  07:23    


 


Triglycerides  146.0 mg/dL (0.0-149.0)   09/19/21  07:23    


 


Cholesterol  176 mg/dL (0-200)   09/19/21  07:23    


 


LDL Cholesterol, Calc  110.8 mg/dL (0.0-131.0)   09/19/21  07:23    


 


VLDL Cholesterol, Calc  29.20 mg/dL (5.00-40.00)   09/19/21  07:23    


 


HDL Cholesterol  36.0 mg/dL (40.0-60.0)  L  09/19/21  07:23    


 


Cholesterol/HDL Ratio  4.89   09/19/21  07:23    


 


TSH  1.920 mIU/L (0.465-4.680)   09/19/21  07:23    


 


Coronavirus (PCR)  Not Detected  (Not Detectd)   09/18/21  19:16    











Patient Condition at Discharge: Stable





Plan - Discharge Summary


Discharge Rx Participant: No


New Discharge Prescriptions: 


New


   Sertraline [Zoloft] 50 mg PO DAILY 30 Days  tab





Continue


   ARIPiprazole [Abilify] 10 mg PO DAILY 30 Days  tab


Discharge Medication List





ARIPiprazole [Abilify] 10 mg PO DAILY 30 Days  tab 09/20/21 [Rx]


Sertraline [Zoloft] 50 mg PO DAILY 30 Days  tab 09/20/21 [Rx]








Follow up Appointment(s)/Referral(s): 


[facility] [Outside] - 09/22/21 12:00 pm (9/22 @ Noon with Catina @ [facility])


Atilio Franco MD [Primary Care Provider] - 1-2 days


Patient Instructions/Handouts:  How to Stop Smoking (DC), Depression (DC)


Activity/Diet/Wound Care/Special Instructions: 


Activity and diet as tolerated. Avoid the use of street drugs and alcohol. Take 

all medications as prescribed. When you are in need of refills on your 

medications please contact your medical provider and/or outpatient psychiatrist 

to have this done. Please go to scheduled outpatient appointment for aftercare 

treatment. If symptoms return or become worse, call the crisis line at 

[phone] and/or go to the nearest emergency room for evaluation. 


Discharge Disposition: HOME SELF-CARE